# Patient Record
Sex: FEMALE | Race: BLACK OR AFRICAN AMERICAN | NOT HISPANIC OR LATINO | Employment: OTHER | URBAN - METROPOLITAN AREA
[De-identification: names, ages, dates, MRNs, and addresses within clinical notes are randomized per-mention and may not be internally consistent; named-entity substitution may affect disease eponyms.]

---

## 2023-03-17 DIAGNOSIS — K21.9 GASTROESOPHAGEAL REFLUX DISEASE WITHOUT ESOPHAGITIS: ICD-10-CM

## 2023-03-21 PROBLEM — R94.31 LONG QT INTERVAL: Status: ACTIVE | Noted: 2023-03-21

## 2023-03-21 PROBLEM — G89.29 CHRONIC LEFT-SIDED LOW BACK PAIN WITH LEFT-SIDED SCIATICA: Status: ACTIVE | Noted: 2023-03-21

## 2023-03-21 PROBLEM — I12.9 SECONDARY DM WITH CKD STAGE 3 AND HYPERTENSION (MULTI): Status: ACTIVE | Noted: 2023-03-21

## 2023-03-21 PROBLEM — N18.32 CHRONIC KIDNEY DISEASE, STAGE 3B (MULTI): Status: ACTIVE | Noted: 2023-03-21

## 2023-03-21 PROBLEM — L72.3 SEBACEOUS CYST: Status: ACTIVE | Noted: 2023-03-21

## 2023-03-21 PROBLEM — K22.70 BARRETT ESOPHAGUS: Status: ACTIVE | Noted: 2023-03-21

## 2023-03-21 PROBLEM — K21.9 GASTROESOPHAGEAL REFLUX DISEASE WITHOUT ESOPHAGITIS: Status: ACTIVE | Noted: 2023-03-21

## 2023-03-21 PROBLEM — I10 ESSENTIAL HYPERTENSION WITH GOAL BLOOD PRESSURE LESS THAN 130/80: Status: ACTIVE | Noted: 2023-03-21

## 2023-03-21 PROBLEM — E03.9 HYPOTHYROIDISM: Status: ACTIVE | Noted: 2023-03-21

## 2023-03-21 PROBLEM — L84 FOOT CALLUS: Status: ACTIVE | Noted: 2023-03-21

## 2023-03-21 PROBLEM — H61.21 IMPACTED CERUMEN OF RIGHT EAR: Status: ACTIVE | Noted: 2023-03-21

## 2023-03-21 PROBLEM — N63.0 BREAST MASS IN FEMALE: Status: ACTIVE | Noted: 2023-03-21

## 2023-03-21 PROBLEM — N18.30 SECONDARY DM WITH CKD STAGE 3 AND HYPERTENSION (MULTI): Status: ACTIVE | Noted: 2023-03-21

## 2023-03-21 PROBLEM — I34.0 CHRONIC MITRAL VALVE REGURGITATION: Status: ACTIVE | Noted: 2023-03-21

## 2023-03-21 PROBLEM — I51.9 CARDIAC DISEASE: Status: ACTIVE | Noted: 2023-03-21

## 2023-03-21 PROBLEM — R92.8 ABNORMAL FINDING ON BREAST IMAGING: Status: ACTIVE | Noted: 2023-03-21

## 2023-03-21 PROBLEM — J30.9 ALLERGIC RHINITIS: Status: ACTIVE | Noted: 2023-03-21

## 2023-03-21 PROBLEM — E11.9 ENCOUNTER FOR DIABETIC FOOT EXAM (MULTI): Status: ACTIVE | Noted: 2023-03-21

## 2023-03-21 PROBLEM — M54.42 CHRONIC LEFT-SIDED LOW BACK PAIN WITH LEFT-SIDED SCIATICA: Status: ACTIVE | Noted: 2023-03-21

## 2023-03-21 PROBLEM — R93.89 ABNORMAL CT SCAN, CHEST: Status: ACTIVE | Noted: 2023-03-21

## 2023-03-21 PROBLEM — D64.9 ANEMIA: Status: ACTIVE | Noted: 2023-03-21

## 2023-03-21 PROBLEM — I50.9 HEART FAILURE (MULTI): Status: ACTIVE | Noted: 2023-03-21

## 2023-03-21 PROBLEM — E13.22 SECONDARY DM WITH CKD STAGE 3 AND HYPERTENSION (MULTI): Status: ACTIVE | Noted: 2023-03-21

## 2023-03-21 PROBLEM — R09.A2 GLOBUS PHARYNGEUS: Status: ACTIVE | Noted: 2023-03-21

## 2023-03-21 RX ORDER — ATORVASTATIN CALCIUM 20 MG/1
1 TABLET, FILM COATED ORAL DAILY
COMMUNITY
Start: 2018-08-01 | End: 2023-04-13 | Stop reason: SDUPTHER

## 2023-03-21 RX ORDER — ASPIRIN 81 MG/1
1 TABLET ORAL DAILY
COMMUNITY

## 2023-03-21 RX ORDER — ESOMEPRAZOLE MAGNESIUM 40 MG/1
CAPSULE, DELAYED RELEASE ORAL 2 TIMES DAILY
COMMUNITY
Start: 2019-11-21 | End: 2023-06-16 | Stop reason: SDUPTHER

## 2023-03-21 RX ORDER — DULAGLUTIDE 3 MG/.5ML
INJECTION, SOLUTION SUBCUTANEOUS
COMMUNITY
Start: 2023-03-12 | End: 2023-04-12 | Stop reason: SDUPTHER

## 2023-03-21 RX ORDER — SPIRONOLACTONE 25 MG/1
1 TABLET ORAL DAILY
COMMUNITY
Start: 2019-12-10 | End: 2023-08-24 | Stop reason: SDUPTHER

## 2023-03-21 RX ORDER — LISINOPRIL 40 MG/1
1 TABLET ORAL DAILY
COMMUNITY
Start: 2016-04-20

## 2023-03-21 RX ORDER — METOCLOPRAMIDE 10 MG/1
TABLET ORAL
COMMUNITY
Start: 2019-11-13 | End: 2023-03-21 | Stop reason: SDUPTHER

## 2023-03-21 RX ORDER — METFORMIN HYDROCHLORIDE 500 MG/1
TABLET, EXTENDED RELEASE ORAL
COMMUNITY
Start: 2017-04-14 | End: 2023-05-16 | Stop reason: SDUPTHER

## 2023-03-21 RX ORDER — FUROSEMIDE 20 MG/1
1 TABLET ORAL DAILY
COMMUNITY
Start: 2016-07-29 | End: 2023-04-13 | Stop reason: SDUPTHER

## 2023-03-21 RX ORDER — AMLODIPINE BESYLATE 10 MG/1
1 TABLET ORAL DAILY
COMMUNITY
Start: 2018-01-24 | End: 2023-04-12 | Stop reason: SDUPTHER

## 2023-03-21 RX ORDER — BLOOD SUGAR DIAGNOSTIC
STRIP MISCELLANEOUS
COMMUNITY
Start: 2017-09-27

## 2023-03-21 RX ORDER — DAPAGLIFLOZIN 5 MG/1
TABLET, FILM COATED ORAL
COMMUNITY
Start: 2022-01-13 | End: 2023-08-21 | Stop reason: SDUPTHER

## 2023-03-21 RX ORDER — CARVEDILOL 25 MG/1
TABLET ORAL
COMMUNITY
Start: 2016-08-03 | End: 2023-07-13 | Stop reason: SDUPTHER

## 2023-03-21 RX ORDER — INSULIN GLARGINE 300 U/ML
INJECTION, SOLUTION SUBCUTANEOUS
COMMUNITY
End: 2023-10-20 | Stop reason: ALTCHOICE

## 2023-03-28 RX ORDER — METOCLOPRAMIDE 10 MG/1
TABLET ORAL
Qty: 60 TABLET | Refills: 0 | Status: SHIPPED | OUTPATIENT
Start: 2023-03-28 | End: 2023-05-31 | Stop reason: SDUPTHER

## 2023-04-12 DIAGNOSIS — I12.9 SECONDARY DM WITH CKD STAGE 3 AND HYPERTENSION (MULTI): ICD-10-CM

## 2023-04-12 DIAGNOSIS — N18.30 SECONDARY DM WITH CKD STAGE 3 AND HYPERTENSION (MULTI): ICD-10-CM

## 2023-04-12 DIAGNOSIS — I10 ESSENTIAL HYPERTENSION WITH GOAL BLOOD PRESSURE LESS THAN 130/80: ICD-10-CM

## 2023-04-12 DIAGNOSIS — E13.22 SECONDARY DM WITH CKD STAGE 3 AND HYPERTENSION (MULTI): ICD-10-CM

## 2023-04-12 DIAGNOSIS — I50.9 HEART FAILURE, UNSPECIFIED HF CHRONICITY, UNSPECIFIED HEART FAILURE TYPE (MULTI): Primary | ICD-10-CM

## 2023-04-16 RX ORDER — AMLODIPINE BESYLATE 10 MG/1
10 TABLET ORAL DAILY
Qty: 90 TABLET | Refills: 3 | Status: SHIPPED | OUTPATIENT
Start: 2023-04-16 | End: 2024-01-12 | Stop reason: SDUPTHER

## 2023-04-16 RX ORDER — FUROSEMIDE 20 MG/1
20 TABLET ORAL DAILY
Qty: 90 TABLET | Refills: 3 | Status: SHIPPED | OUTPATIENT
Start: 2023-04-16 | End: 2024-04-10 | Stop reason: SDUPTHER

## 2023-04-16 RX ORDER — DULAGLUTIDE 3 MG/.5ML
INJECTION, SOLUTION SUBCUTANEOUS
Qty: 6 ML | Refills: 1 | Status: SHIPPED | OUTPATIENT
Start: 2023-04-16 | End: 2023-09-26 | Stop reason: SDUPTHER

## 2023-04-16 RX ORDER — ATORVASTATIN CALCIUM 20 MG/1
20 TABLET, FILM COATED ORAL DAILY
Qty: 90 TABLET | Refills: 3 | Status: SHIPPED | OUTPATIENT
Start: 2023-04-16 | End: 2024-05-06

## 2023-05-02 ENCOUNTER — TELEPHONE (OUTPATIENT)
Dept: PRIMARY CARE | Facility: CLINIC | Age: 73
End: 2023-05-02
Payer: MEDICARE

## 2023-05-02 DIAGNOSIS — N63.10 MASS OF RIGHT BREAST, UNSPECIFIED QUADRANT: Primary | ICD-10-CM

## 2023-05-02 NOTE — TELEPHONE ENCOUNTER
I called  Advised to call to schedule appt. For deann and ultrasound    The patient agrees and understands the above plan.  All questions have been answered.  Pt thankful for the follow up call.  Reyna Ma, DO     Enlarging spiculated right breast mass with increasing pleomorphic  calcifications consistent with malignancy. Recommendations are for  further evaluation with both ultrasound and spot magnification views.

## 2023-05-16 DIAGNOSIS — I50.9 HEART FAILURE, UNSPECIFIED HF CHRONICITY, UNSPECIFIED HEART FAILURE TYPE (MULTI): Primary | ICD-10-CM

## 2023-05-16 DIAGNOSIS — N18.30 SECONDARY DM WITH CKD STAGE 3 AND HYPERTENSION (MULTI): ICD-10-CM

## 2023-05-16 DIAGNOSIS — I10 ESSENTIAL HYPERTENSION WITH GOAL BLOOD PRESSURE LESS THAN 130/80: ICD-10-CM

## 2023-05-16 DIAGNOSIS — I12.9 SECONDARY DM WITH CKD STAGE 3 AND HYPERTENSION (MULTI): ICD-10-CM

## 2023-05-16 DIAGNOSIS — E13.22 SECONDARY DM WITH CKD STAGE 3 AND HYPERTENSION (MULTI): ICD-10-CM

## 2023-05-23 RX ORDER — METFORMIN HYDROCHLORIDE 500 MG/1
500 TABLET, EXTENDED RELEASE ORAL 2 TIMES DAILY
Qty: 180 TABLET | Refills: 0 | Status: SHIPPED | OUTPATIENT
Start: 2023-05-23 | End: 2023-08-24 | Stop reason: SDUPTHER

## 2023-05-23 NOTE — TELEPHONE ENCOUNTER
Please call patient.  Refilled her medication however she needs some labs prior to her next appointment.  Order fasting labs -could do between now and our next appointment    Should fast for 10 to 12 hours prior to appointment

## 2023-05-31 DIAGNOSIS — K21.9 GASTROESOPHAGEAL REFLUX DISEASE WITHOUT ESOPHAGITIS: ICD-10-CM

## 2023-06-02 DIAGNOSIS — N63.0 BREAST MASS IN FEMALE: Primary | ICD-10-CM

## 2023-06-05 RX ORDER — METOCLOPRAMIDE 10 MG/1
TABLET ORAL
Qty: 60 TABLET | Refills: 0 | Status: SHIPPED | OUTPATIENT
Start: 2023-06-05 | End: 2023-08-02 | Stop reason: SDUPTHER

## 2023-06-09 NOTE — TELEPHONE ENCOUNTER
Patient of Dr. Ma is requesting a medication that is comparable to Esomeprazole she stated that her pharmacy is out of this medication.    I would recommend more pantoprazole than reglan for the patient   It is the same type of medicine   Remind her to use vitamin b12 and magnesium because these medicines take this out of the body

## 2023-06-10 RX ORDER — PANTOPRAZOLE SODIUM 40 MG/1
40 TABLET, DELAYED RELEASE ORAL
Qty: 30 TABLET | Refills: 11 | Status: SHIPPED | OUTPATIENT
Start: 2023-06-10 | End: 2023-06-16 | Stop reason: ALTCHOICE

## 2023-06-14 ENCOUNTER — TELEPHONE (OUTPATIENT)
Dept: PRIMARY CARE | Facility: CLINIC | Age: 73
End: 2023-06-14
Payer: MEDICARE

## 2023-06-14 DIAGNOSIS — K22.70 BARRETT'S ESOPHAGUS WITHOUT DYSPLASIA: Primary | ICD-10-CM

## 2023-06-14 DIAGNOSIS — K21.9 GASTROESOPHAGEAL REFLUX DISEASE WITHOUT ESOPHAGITIS: ICD-10-CM

## 2023-06-16 RX ORDER — ESOMEPRAZOLE MAGNESIUM 40 MG/1
40 CAPSULE, DELAYED RELEASE ORAL
Qty: 90 CAPSULE | Refills: 0 | Status: SHIPPED | OUTPATIENT
Start: 2023-06-16 | End: 2023-08-27 | Stop reason: SDUPTHER

## 2023-06-16 NOTE — TELEPHONE ENCOUNTER
I called pt  I refilled esomeprazole (per GI)  Ok to stop the pantoprazole- ordered in error.    The patient agrees and understands the above plan.  All questions have been answered.  Pt thankful for the follow up call.  Reyna Ma, DO

## 2023-07-12 LAB
COMPLETE PATHOLOGY REPORT: NORMAL
CONVERTED ADDENDUM DIAGNOSIS 2: NORMAL
CONVERTED ADDENDUM DIAGNOSIS 3: NORMAL
CONVERTED CLINICAL DIAGNOSIS-HISTORY: NORMAL
CONVERTED FINAL DIAGNOSIS: NORMAL
CONVERTED FINAL REPORT PDF LINK TO COPY AND PASTE: NORMAL
CONVERTED GROSS DESCRIPTION: NORMAL

## 2023-07-13 DIAGNOSIS — I10 ESSENTIAL HYPERTENSION WITH GOAL BLOOD PRESSURE LESS THAN 130/80: Primary | ICD-10-CM

## 2023-07-13 DIAGNOSIS — I50.9 HEART FAILURE, UNSPECIFIED HF CHRONICITY, UNSPECIFIED HEART FAILURE TYPE (MULTI): ICD-10-CM

## 2023-07-19 RX ORDER — CARVEDILOL 25 MG/1
25 TABLET ORAL
Qty: 180 TABLET | Refills: 3 | Status: SHIPPED | OUTPATIENT
Start: 2023-07-19

## 2023-08-02 DIAGNOSIS — K21.9 GASTROESOPHAGEAL REFLUX DISEASE WITHOUT ESOPHAGITIS: ICD-10-CM

## 2023-08-11 RX ORDER — METOCLOPRAMIDE 10 MG/1
TABLET ORAL
Qty: 60 TABLET | Refills: 0 | Status: SHIPPED | OUTPATIENT
Start: 2023-08-11 | End: 2023-09-26 | Stop reason: SDUPTHER

## 2023-08-21 DIAGNOSIS — E13.22 SECONDARY DM WITH CKD STAGE 3 AND HYPERTENSION (MULTI): ICD-10-CM

## 2023-08-21 DIAGNOSIS — I10 ESSENTIAL HYPERTENSION WITH GOAL BLOOD PRESSURE LESS THAN 130/80: ICD-10-CM

## 2023-08-21 DIAGNOSIS — K22.70 BARRETT'S ESOPHAGUS WITHOUT DYSPLASIA: ICD-10-CM

## 2023-08-21 DIAGNOSIS — N18.30 SECONDARY DM WITH CKD STAGE 3 AND HYPERTENSION (MULTI): ICD-10-CM

## 2023-08-21 DIAGNOSIS — I50.9 HEART FAILURE, UNSPECIFIED HF CHRONICITY, UNSPECIFIED HEART FAILURE TYPE (MULTI): ICD-10-CM

## 2023-08-21 DIAGNOSIS — K21.9 GASTROESOPHAGEAL REFLUX DISEASE WITHOUT ESOPHAGITIS: ICD-10-CM

## 2023-08-21 DIAGNOSIS — I12.9 SECONDARY DM WITH CKD STAGE 3 AND HYPERTENSION (MULTI): ICD-10-CM

## 2023-08-23 NOTE — TELEPHONE ENCOUNTER
Patient is requesting refills of her Farxiga and her esomeprazole, She states she is out of medication.

## 2023-08-24 ENCOUNTER — HOSPITAL ENCOUNTER (OUTPATIENT)
Dept: DATA CONVERSION | Facility: HOSPITAL | Age: 73
End: 2023-08-24
Attending: SURGERY | Admitting: SURGERY
Payer: MEDICARE

## 2023-08-24 DIAGNOSIS — C50.911 MALIGNANT NEOPLASM OF UNSPECIFIED SITE OF RIGHT FEMALE BREAST (MULTI): ICD-10-CM

## 2023-08-24 DIAGNOSIS — C50.919 MALIGNANT NEOPLASM OF UNSPECIFIED SITE OF UNSPECIFIED FEMALE BREAST (MULTI): ICD-10-CM

## 2023-08-24 LAB
ANION GAP IN SER/PLAS: 15 MMOL/L (ref 10–20)
CALCIUM (MG/DL) IN SER/PLAS: 10.2 MG/DL (ref 8.6–10.3)
CARBON DIOXIDE, TOTAL (MMOL/L) IN SER/PLAS: 22 MMOL/L (ref 21–32)
CHLORIDE (MMOL/L) IN SER/PLAS: 106 MMOL/L (ref 98–107)
CREATININE (MG/DL) IN SER/PLAS: 1.37 MG/DL (ref 0.5–1.05)
GFR FEMALE: 40 ML/MIN/1.73M2
GLUCOSE (MG/DL) IN SER/PLAS: 129 MG/DL (ref 74–99)
HEMATOCRIT (%) IN BLOOD BY AUTOMATED COUNT: 34.6 % (ref 36–46)
HEMOGLOBIN (G/DL) IN BLOOD: 11.5 G/DL (ref 12–16)
POCT GLUCOSE: 135 MG/DL (ref 74–99)
POTASSIUM (MMOL/L) IN SER/PLAS: 4.9 MMOL/L (ref 3.5–5.3)
SODIUM (MMOL/L) IN SER/PLAS: 138 MMOL/L (ref 136–145)
UREA NITROGEN (MG/DL) IN SER/PLAS: 28 MG/DL (ref 6–23)

## 2023-08-25 LAB
ATRIAL RATE: 76 BPM
P AXIS: 59 DEGREES
P OFFSET: 171 MS
P ONSET: 113 MS
PR INTERVAL: 198 MS
Q ONSET: 212 MS
QRS COUNT: 12 BEATS
QRS DURATION: 90 MS
QT INTERVAL: 404 MS
QTC CALCULATION(BAZETT): 454 MS
QTC FREDERICIA: 436 MS
R AXIS: -7 DEGREES
T AXIS: 27 DEGREES
T OFFSET: 414 MS
VENTRICULAR RATE: 76 BPM

## 2023-08-27 DIAGNOSIS — K21.9 GASTROESOPHAGEAL REFLUX DISEASE WITHOUT ESOPHAGITIS: ICD-10-CM

## 2023-08-27 DIAGNOSIS — K22.70 BARRETT'S ESOPHAGUS WITHOUT DYSPLASIA: ICD-10-CM

## 2023-08-27 RX ORDER — ESOMEPRAZOLE MAGNESIUM 40 MG/1
40 CAPSULE, DELAYED RELEASE ORAL
Qty: 30 CAPSULE | Refills: 0 | Status: SHIPPED | OUTPATIENT
Start: 2023-08-27 | End: 2023-10-10 | Stop reason: SDUPTHER

## 2023-08-29 LAB
COMPLETE PATHOLOGY REPORT: NORMAL
CONVERTED ADDENDUM COMMENT 2: NORMAL
CONVERTED ADDENDUM DIAGNOSIS 2: NORMAL
CONVERTED CLINICAL DIAGNOSIS-HISTORY: NORMAL
CONVERTED FINAL DIAGNOSIS: NORMAL
CONVERTED FINAL REPORT PDF LINK TO COPY AND PASTE: NORMAL
CONVERTED GROSS DESCRIPTION: NORMAL
CONVERTED SYNOPTIC DIAGNOSIS: NORMAL

## 2023-08-30 RX ORDER — SPIRONOLACTONE 25 MG/1
25 TABLET ORAL DAILY
Qty: 90 TABLET | Refills: 3 | Status: SHIPPED | OUTPATIENT
Start: 2023-08-30

## 2023-08-30 RX ORDER — ESOMEPRAZOLE MAGNESIUM 40 MG/1
40 CAPSULE, DELAYED RELEASE ORAL
Qty: 90 CAPSULE | Refills: 0 | OUTPATIENT
Start: 2023-08-30

## 2023-08-30 RX ORDER — DAPAGLIFLOZIN 5 MG/1
5 TABLET, FILM COATED ORAL DAILY
Qty: 90 TABLET | Refills: 3 | Status: SHIPPED | OUTPATIENT
Start: 2023-08-30

## 2023-08-30 RX ORDER — METFORMIN HYDROCHLORIDE 500 MG/1
500 TABLET, EXTENDED RELEASE ORAL 2 TIMES DAILY
Qty: 180 TABLET | Refills: 3 | Status: SHIPPED | OUTPATIENT
Start: 2023-08-30

## 2023-09-01 ENCOUNTER — TELEPHONE (OUTPATIENT)
Dept: PRIMARY CARE | Facility: CLINIC | Age: 73
End: 2023-09-01

## 2023-09-01 NOTE — TELEPHONE ENCOUNTER
Patient requested the following prescriptions:   dapagliflozin propanediol (Farxiga) 5 mg     If approved please send to pharmacy listed:  [facility] [address] [place] [address] - [phone]

## 2023-09-08 NOTE — TELEPHONE ENCOUNTER
Patient called and would like to know it is okay to take the flu shot, RSV shot, and the COVID shot. She is very concerned about getting sick. I can call her back with a definitive answer. Please advise

## 2023-09-13 NOTE — TELEPHONE ENCOUNTER
Okay to take all 3 vaccines.  If she would like to space them out and not take them at the same time okay to do this as well.    Everyone's reaction to the vaccine is different so I am unsure if she would get sick behind any of those vaccines  -Assure her that she cannot get RSV, COVID, or flu after if you have those vaccines, they are not live viruses

## 2023-09-19 DIAGNOSIS — E13.22 SECONDARY DM WITH CKD STAGE 3 AND HYPERTENSION (MULTI): ICD-10-CM

## 2023-09-19 DIAGNOSIS — K21.9 GASTROESOPHAGEAL REFLUX DISEASE WITHOUT ESOPHAGITIS: ICD-10-CM

## 2023-09-19 DIAGNOSIS — N18.30 SECONDARY DM WITH CKD STAGE 3 AND HYPERTENSION (MULTI): ICD-10-CM

## 2023-09-19 DIAGNOSIS — I12.9 SECONDARY DM WITH CKD STAGE 3 AND HYPERTENSION (MULTI): ICD-10-CM

## 2023-09-19 NOTE — TELEPHONE ENCOUNTER
Rx refill.   metoclopramide (Reglan) 10 mg tablet 60 tablet 0 8/11/2023     Use with meals po, prn nausea 3-4 X per day          [facility] [address] [place] [address] [facility] Phone: [phone]   Fax: [phone]        Patient c/o nausea every now and then.  Patient called again. Please, refill  Trulicity is needed. She took last pen on 9/24/23.

## 2023-09-29 VITALS — WEIGHT: 171.3 LBS | HEIGHT: 62 IN | BODY MASS INDEX: 31.52 KG/M2

## 2023-09-30 NOTE — H&P
History & Physical Reviewed:   I have reviewed the History and Physical dated:  24-Aug-2023   History and Physical reviewed and relevant findings noted. Patient examined to review pertinent physical  findings.: No significant changes   Home Medications Reviewed: no changes noted   Allergies Reviewed: no changes noted       ERAS (Enhanced Recovery After Surgery):  ·  ERAS Patient: no     Consent:   COVID-19 Consent:  ·  COVID-19 Risk Consent Surgeon has reviewed key risks related to the risk of fol COVID-19 and if they contract COVID-19 what the risks are.       Electronic Signatures:  Concepcion Elizondo)  (Signed 24-Aug-2023 10:34)   Authored: History & Physical Reviewed, ERAS, Consent,  Note Completion      Last Updated: 24-Aug-2023 10:34 by Concepcion Elizondo)

## 2023-10-01 PROBLEM — L82.1 OTHER SEBORRHEIC KERATOSIS: Status: ACTIVE | Noted: 2023-03-02

## 2023-10-01 PROBLEM — L57.9 SKIN CHANGES DUE TO CHRONIC EXPOSURE TO NONIONIZING RADIATION, UNSPECIFIED: Status: ACTIVE | Noted: 2023-03-02

## 2023-10-01 PROBLEM — D48.5 NEOPLASM OF UNCERTAIN BEHAVIOR OF SKIN: Status: ACTIVE | Noted: 2023-03-02

## 2023-10-01 PROBLEM — C50.919 BREAST CANCER (MULTI): Status: ACTIVE | Noted: 2023-10-01

## 2023-10-01 RX ORDER — TRETINOIN 0.25 MG/G
CREAM TOPICAL
COMMUNITY
Start: 2022-11-15

## 2023-10-01 NOTE — OP NOTE
PROCEDURE DETAILS    Preoperative Diagnosis:  Malignant neoplasm determined by biopsy of breast, C50.919    Postoperative Diagnosis:  Malignant neoplasm determined by biopsy of breast, C50.919    Surgeon: Concepcion Elizondo  Resident/Fellow/Other Assistant: Dolores Chavarria    Procedure:  1. RIGHT BREAST MAGSEED LOCALIZED BRACKETED PARTIAL MASTECTOMY    Anesthesia: No anesthesiologist associated with this case  Estimated Blood Loss: < 30 cc  Findings: 1) clip, mag seed x 2, calcs in specimen xray        Operative Report:   OPERATIVE PROCEDURE: Informed  consent was obtained. The surgical site marked and the Day of Surgery Update completed. The patient was taken  to the operating room and positioned in a supine position on the operating room table. Anesthesia was induced without difficulty. SCDs were placed for DVT prophylaxis. Antibiotics were administered within 60 minutes prior to incision for surgical  prophylaxis. Lower body Jose Hugger was applied to help maintain normothermia.     I reviewed my note and the appropriate films.  A surgical safety time-out was performed.     Using the sentimag probe, I approximated the location of the mag seeds marking the lesion of concern.      The patient was sterilely prepped and draped in the usual fashion.       I then turned my attention to the right breast. The skin and surrounding tissue was anesthetized with local anesthetic. A   circumareolar skin incision was made with a 15 blade scalpel.  Subcutaneous tissues were sharply divided down to and into subcutaneous fat using Bovie cautery.  I dissected towards  the mag seed. Mag seed location was confirmed throughout using the sentimag probe. I grasped the intended specimen  with an Allis clamp.  I then dissected towards the more superior and posterior seed. The entire area of tissue surrounding the mag seeds was excised en bloc with the mag seeds intact.  The specimen was oriented with a short stitch superiorly and a long  stitch laterally, and labeled as right breast mag seed localized partial mastectomy (bracket).  Specimen radiograph was taken and reviewed by the radiologist. The clip was identified  in the specimen and the mag seeds were intact. Calcifications were present.  Radiologically the margins appeared adequate.  The film was reviewed by radiology.     I then excised 6 cavity shave margins using the bovie and oriented the new margin in each with a stitch.      I placed clips around the perimeter of the lumpectomy cavity and 2 clips in the posterior margin. The cavity was irrigated with sterile saline solution.  Hemostasis was achieved  and confirmed with cautery.       Hemostasis was achieved with bovie.  The  deep dermal layer was closed with interrupted 3-0 vicryl sutures.  The skin was closed with a running 4-0 monocryl subcuticular stitch.      A sterile dressing was applied.  A surgical bra was used for light compression.     Anesthesia was reversed and the patient was brought to the recovery room in stable condition having tolerated the procedure well.  There were no complications.  30 cc of 1% lidocaine/0.25%  marcaine mixed was used throughout the case.                               Attestation:   Note Completion:  Attending Attestation I performed the procedure without a resident         Electronic Signatures:  Concepcion Elizondo)  (Signed 24-Aug-2023 12:03)   Authored: Post-Operative Note, Chart Review, Note Completion      Last Updated: 24-Aug-2023 12:03 by Concepcion Elizondo)

## 2023-10-02 RX ORDER — METOCLOPRAMIDE 10 MG/1
TABLET ORAL
Qty: 60 TABLET | Refills: 2 | Status: SHIPPED
Start: 2023-10-02 | End: 2023-10-20 | Stop reason: ALTCHOICE

## 2023-10-02 RX ORDER — DULAGLUTIDE 3 MG/.5ML
INJECTION, SOLUTION SUBCUTANEOUS
Qty: 6 ML | Refills: 1 | Status: SHIPPED | OUTPATIENT
Start: 2023-10-02 | End: 2024-03-25

## 2023-10-04 ENCOUNTER — HOSPITAL ENCOUNTER (OUTPATIENT)
Dept: RADIATION ONCOLOGY | Facility: CLINIC | Age: 73
Setting detail: RADIATION/ONCOLOGY SERIES
Discharge: STILL ADMITTED OR ADMINISTRATIVE | End: 2023-10-04
Payer: MEDICARE

## 2023-10-04 VITALS
TEMPERATURE: 96.8 F | RESPIRATION RATE: 18 BRPM | HEART RATE: 71 BPM | WEIGHT: 163.8 LBS | SYSTOLIC BLOOD PRESSURE: 132 MMHG | HEIGHT: 61 IN | BODY MASS INDEX: 30.93 KG/M2 | DIASTOLIC BLOOD PRESSURE: 61 MMHG

## 2023-10-04 PROCEDURE — 99214 OFFICE O/P EST MOD 30 MIN: CPT | Performed by: RADIOLOGY

## 2023-10-04 PROCEDURE — 99204 OFFICE O/P NEW MOD 45 MIN: CPT | Performed by: RADIOLOGY

## 2023-10-04 ASSESSMENT — PAIN SCALES - GENERAL: PAINLEVEL: 0-NO PAIN

## 2023-10-04 NOTE — PROGRESS NOTES
Radiation Oncology Outpatient Consult    Patient Name:  Amy Driver  MRN:  [id number]  :  1950    Referring Provider: Concepcion Elizondo MD  Primary Care Provider: Reyna Ma DO  Care Team: Patient Care Team:  Reyna Ma DO as PCP - General  Reyna Ma DO as PCP - [employer] Medicare Advantage PCP    Date of Service: 10/4/2023     SUBJECTIVE  History of Present Illness:  Amy Driver is a 73 y.o. female who was referred by Concepcion Elizondo MD, for a consultation to the [facility] Department of Radiation Oncology.  She is presenting for evaluation and management of a T1c N0 M0, stage IA invasive ductal carcinoma of the right breast status post right partial mastectomy.    Ms. Driver is a 73-year-old female who underwent a routine screening mammogram on 2023, which revealed an enlarging spiculated mass in the upper outer quadrant of the right breast with associated pleomorphic calcifications.  Diagnostic views performed 2023 revealed a 3.3 x 2.3 x 1.8 cm irregular mass with associated calcifications.  She underwent a right breast ultrasound directed at the 10 o'clock position, 7 cm from the nipple which revealed a 2.8 x 1.6 x 3 cm heterogeneous mass which had been biopsied in the past and is consistent with a hamartoma.  At the 11 o'clock position, 4 cm from the nipple was a 1.9 x 2 x 1.7 cm hypoechoic mass.  Axillary ultrasound revealed 2 normal appearing lymph nodes.  On 2023 she underwent a right breast core biopsy directed at the 11 o'clock position, 4 cm from the nipple which revealed invasive ductal carcinoma, grade 2.  Estrogen and progesterone receptors stained positive at greater than 95%.  HER2/itz was 2+ on IHC and 1.2 on dual-velia.  On 2023 she underwent a right partial mastectomy.  Pathology revealed a 2 cm invasive ductal carcinoma, grade 3.  No angiolymphatic invasion was present.  Ductal carcinoma in situ of the comedo, cribriform and solid  subtypes, nuclear grade 3 was present.  There was no element of extensive intraductal component.  The resection margins were negative with tumor 0.12 cm from the lateral margin.  I was asked to see Ms. Villa by Dr. Concepcion Elizondo for a discussion regarding the role of radiation in the management of this newly diagnosed breast cancer.    Prior Radiotherapy:  No  Radiation Treatments       No radiation treatments to show. (Treatments may have been administered in another system.)          Current Systemic Treatment:  No     Presence of Pacemaker or ICD:  No    Past Medical History:    Past Medical History:   Diagnosis Date    Chronic kidney disease, stage 3b (CMS/HCC) 04/19/2022    Chronic kidney disease, stage 3b    Chronic kidney disease, stage 3b (CMS/HCC) 04/19/2022    Chronic kidney disease, stage 3b    Diabetes mellitus (CMS/HCC)     Essential (primary) hypertension 01/03/2018    Essential hypertension with goal blood pressure less than 140/90    Other obesity due to excess calories 04/06/2021    Class 1 obesity due to excess calories with serious comorbidity and body mass index (BMI) of 30.0 to 30.9 in adult    Other specified abnormal findings of blood chemistry 07/29/2016    Elevated brain natriuretic peptide (BNP) level    Other specified cardiac arrhythmias 09/07/2016    Bigeminy    Personal history of other diseases of the circulatory system     History of hypertension    Personal history of other diseases of the digestive system     History of Mills's esophagus    Personal history of other endocrine, nutritional and metabolic disease     History of diabetes mellitus    Personal history of other endocrine, nutritional and metabolic disease 09/07/2016    History of diabetes mellitus    Personal history of other medical treatment 08/06/2016    History of screening mammography    Personal history of other specified conditions 08/31/2020    History of abnormal mammogram    Personal history of other specified  conditions 11/17/2019    History of nausea and vomiting    Personal history of pneumonia (recurrent) 08/06/2016    History of pneumonia    Shortness of breath 08/03/2016    Shortness of breath at rest        Past Surgical History:    Past Surgical History:   Procedure Laterality Date    BI MAMMO GUIDED LOCALIZATION BREAST RIGHT Right 8/21/2023    BI MAMMO GUIDED LOCALIZATION BREAST RIGHT 8/21/2023 [facility]    OTHER SURGICAL HISTORY  12/12/2019    Esophagogastroduodenoscopy        Family History:  There is no family history of breast cancer.    Gynecologic History: Menarche age 12. G0.  Menopause age 44. She took oral contraceptive pills for 2 years.  She denies any history of hormone replacement therapy.    Social History:    Social History     Tobacco Use    Smoking status: Former     Types: Cigarettes    Smokeless tobacco: Never   Vaping Use    Vaping Use: Never used   Substance Use Topics    Alcohol use: Not Currently       Allergies:  No Known Allergies     Medications:    Current Outpatient Medications:     amLODIPine (Norvasc) 10 mg tablet, Take 1 tablet (10 mg) by mouth once daily., Disp: 90 tablet, Rfl: 3    aspirin 81 mg EC tablet, Take 1 tablet (81 mg) by mouth once daily., Disp: , Rfl:     atorvastatin (Lipitor) 20 mg tablet, Take 1 tablet (20 mg) by mouth once daily., Disp: 90 tablet, Rfl: 3    carvedilol (Coreg) 25 mg tablet, Take 1 tablet (25 mg) by mouth 2 times a day with meals., Disp: 180 tablet, Rfl: 3    dapagliflozin propanediol (Farxiga) 5 mg, Take 1 tablet (5 mg) by mouth once daily., Disp: 90 tablet, Rfl: 3    dulaglutide (Trulicity) 3 mg/0.5 mL pen injector, INJECT 1 PEN UNDER THE SKIN ONE TIME WEEKLY ON THE SAME DAY EACH WEEK, Disp: 6 mL, Rfl: 1    esomeprazole (NexIUM) 40 mg DR capsule, Take 1 capsule (40 mg) by mouth once daily in the morning. Take before meals., Disp: 30 capsule, Rfl: 0    furosemide (Lasix) 20 mg tablet, Take 1 tablet (20 mg) by mouth once daily., Disp: 90 tablet, Rfl:  3    insulin glargine (Toujeo SoloStar U-300 Insulin) 300 unit/mL (1.5 mL) injection, Inject under the skin., Disp: , Rfl:     lisinopril 40 mg tablet, Take 1 tablet (40 mg) by mouth once daily., Disp: , Rfl:     metFORMIN  mg 24 hr tablet, Take 1 tablet (500 mg) by mouth 2 times a day., Disp: 180 tablet, Rfl: 3    metoclopramide (Reglan) 10 mg tablet, Use with meals po, prn nausea 3-4 X per day (Patient not taking: Reported on 10/4/2023), Disp: 60 tablet, Rfl: 2    multivitamin-Ca-iron-minerals (Tab-A-Valerio Womens) 27-0.4 mg tablet, Take 1 tablet by mouth once daily., Disp: , Rfl:     OneTouch Ultra Test strip, test 2 to 3 times daily as directed, Disp: , Rfl:     spironolactone (Aldactone) 25 mg tablet, Take 1 tablet (25 mg) by mouth once daily. (Patient not taking: Reported on 10/4/2023), Disp: 90 tablet, Rfl: 3    tretinoin (Retin-A) 0.025 % cream, 1 Application, Disp: , Rfl:         Review of Systems:  Review of Systems   All other systems reviewed and are negative.    The patient's current pain level was assessed.  They report currently having a pain of 0 out of 10.  They feel their pain is under control without the use of pain medications.    Performance Status:  The Karnofsky performance scale today is 100, Fully active, able to carry on all pre-disease performed without restriction (ECOG equivalent 0).        OBJECTIVE  Physical Exam:  There were no vitals taken for this visit.   Physical Exam  Constitutional:       Appearance: Normal appearance.   HENT:      Head: Normocephalic and atraumatic.   Eyes:      Conjunctiva/sclera: Conjunctivae normal.   Cardiovascular:      Rate and Rhythm: Rhythm irregular.      Heart sounds: Normal heart sounds.   Pulmonary:      Breath sounds: Normal breath sounds. No wheezing.   Chest:      Comments: Examination of the right breast reveals a well-healing incision in the upper outer aspect with a large seroma beneath.  There are no suspicious nodules, nipple retraction,  or nipple discharge bilaterally.  Abdominal:      Palpations: Abdomen is soft.   Musculoskeletal:      Right lower leg: No edema.      Left lower leg: No edema.   Lymphadenopathy:      Comments: There is no cervical, supraclavicular or axillary lymphadenopathy.   Skin:     General: Skin is warm and dry.   Neurological:      Mental Status: She is alert.               ASSESSMENT:  Amy Driver is a 73 y.o. female with Breast cancer (CMS/[facility]), Pathologic: Stage Unknown (pT1c, pNX, cM0, G3, ER+, GA+, HER2-, Oncotype DX score: 23).  She is status post right partial mastectomy.     PLAN: I had a long conversation with the patient detailing the role of radiation in the management of this newly diagnosed breast cancer.  We did review the trials looking at small, estrogen receptor positive, lymph node negative breast cancer in women over the age of 65 who received endocrine therapy with and without radiation.  I did tell her there was a 10% reduction in local recurrence with the addition of radiation.  We did also discussed that grade 3 tumors were underrepresented in those trials and her actual recurrence risk may be a little bit higher.  We did review the details of radiation as well.She does understand the reduction in local recurrence although has declined radiation in favor of endocrine therapy.  I have asked her to reach out with any questions or concerns.  I will see her back on a as needed basis.  NCCN Guidelines were applicable to guide this patients treatment plan.   Heike River MD

## 2023-10-10 DIAGNOSIS — K21.9 GASTROESOPHAGEAL REFLUX DISEASE WITHOUT ESOPHAGITIS: ICD-10-CM

## 2023-10-10 DIAGNOSIS — K22.70 BARRETT'S ESOPHAGUS WITHOUT DYSPLASIA: ICD-10-CM

## 2023-10-12 NOTE — TELEPHONE ENCOUNTER
esomeprazole (NexIUM) 40 mg DR capsule   [facility] - [place], [place] - [address] [facility] Phone: [phone]   Fax: [phone]      Patient stated she has no refills.

## 2023-10-15 RX ORDER — ESOMEPRAZOLE MAGNESIUM 40 MG/1
40 CAPSULE, DELAYED RELEASE ORAL
Qty: 90 CAPSULE | Refills: 0 | Status: SHIPPED | OUTPATIENT
Start: 2023-10-15 | End: 2024-01-09 | Stop reason: SDUPTHER

## 2023-10-17 ENCOUNTER — TELEPHONE (OUTPATIENT)
Dept: PRIMARY CARE | Facility: CLINIC | Age: 73
End: 2023-10-17
Payer: MEDICARE

## 2023-10-17 ENCOUNTER — APPOINTMENT (OUTPATIENT)
Dept: HEMATOLOGY/ONCOLOGY | Facility: CLINIC | Age: 73
End: 2023-10-17
Payer: MEDICARE

## 2023-10-17 NOTE — TELEPHONE ENCOUNTER
I sent this over on 10/15/23- pls have her check with the pharm to be sure  And let me know if she is having issues

## 2023-10-17 NOTE — TELEPHONE ENCOUNTER
Patient has called about her RX refill esomeprazole (NexIUM) 40 mg DR capsule [[id number]]    Order Details    Dose: 40 mg Route: oral Frequency: Daily before breakfast   Dispense Quantity: 90 capsule Refills: 0       Associated Diagnoses: Mills's esophagus without dysplasia [K22.70]; Gastroesophageal reflux disease without esophagitis [K21.9]   Original Order: esomeprazole (NexIUM) 40 mg DR capsule [[id number]]   Providers    Ordering and Authorizing Provider: Reyna Ma DO NPI: [id number]   GABINO #: [id number]   Ordering User: Reyna Ma DO          Pharmacy    [id number] - [place], [place] - [address] [facility] [address] [facility] [address] [address] [facility] [address]  Phone: [phone]  Fax: [phone]  GABINO #: [id number]

## 2023-10-20 ENCOUNTER — OFFICE VISIT (OUTPATIENT)
Dept: HEMATOLOGY/ONCOLOGY | Facility: CLINIC | Age: 73
End: 2023-10-20
Payer: MEDICARE

## 2023-10-20 ENCOUNTER — ONCOLOGY MEDICATION OUTREACH (OUTPATIENT)
Dept: HEMATOLOGY/ONCOLOGY | Facility: CLINIC | Age: 73
End: 2023-10-20
Payer: MEDICARE

## 2023-10-20 VITALS
TEMPERATURE: 98.1 F | DIASTOLIC BLOOD PRESSURE: 72 MMHG | HEART RATE: 67 BPM | OXYGEN SATURATION: 99 % | SYSTOLIC BLOOD PRESSURE: 141 MMHG | WEIGHT: 167.99 LBS | BODY MASS INDEX: 31.31 KG/M2

## 2023-10-20 DIAGNOSIS — M81.8 OTHER OSTEOPOROSIS WITHOUT CURRENT PATHOLOGICAL FRACTURE: ICD-10-CM

## 2023-10-20 DIAGNOSIS — N63.0 BREAST MASS IN FEMALE: ICD-10-CM

## 2023-10-20 DIAGNOSIS — Z17.0 MALIGNANT NEOPLASM OF UPPER-OUTER QUADRANT OF RIGHT BREAST IN FEMALE, ESTROGEN RECEPTOR POSITIVE (MULTI): Primary | ICD-10-CM

## 2023-10-20 DIAGNOSIS — C50.411 MALIGNANT NEOPLASM OF UPPER-OUTER QUADRANT OF RIGHT BREAST IN FEMALE, ESTROGEN RECEPTOR POSITIVE (MULTI): Primary | ICD-10-CM

## 2023-10-20 PROCEDURE — 3078F DIAST BP <80 MM HG: CPT | Performed by: INTERNAL MEDICINE

## 2023-10-20 PROCEDURE — 4010F ACE/ARB THERAPY RXD/TAKEN: CPT | Performed by: INTERNAL MEDICINE

## 2023-10-20 PROCEDURE — 99205 OFFICE O/P NEW HI 60 MIN: CPT | Performed by: INTERNAL MEDICINE

## 2023-10-20 PROCEDURE — 1159F MED LIST DOCD IN RCRD: CPT | Performed by: INTERNAL MEDICINE

## 2023-10-20 PROCEDURE — 1126F AMNT PAIN NOTED NONE PRSNT: CPT | Performed by: INTERNAL MEDICINE

## 2023-10-20 PROCEDURE — 1036F TOBACCO NON-USER: CPT | Performed by: INTERNAL MEDICINE

## 2023-10-20 PROCEDURE — 3077F SYST BP >= 140 MM HG: CPT | Performed by: INTERNAL MEDICINE

## 2023-10-20 PROCEDURE — 3051F HG A1C>EQUAL 7.0%<8.0%: CPT | Performed by: INTERNAL MEDICINE

## 2023-10-20 PROCEDURE — 99215 OFFICE O/P EST HI 40 MIN: CPT | Performed by: INTERNAL MEDICINE

## 2023-10-20 RX ORDER — ANASTROZOLE 1 MG/1
1 TABLET ORAL DAILY
Qty: 30 TABLET | Refills: 5 | Status: SHIPPED | OUTPATIENT
Start: 2023-10-20 | End: 2024-04-17

## 2023-10-20 ASSESSMENT — PAIN SCALES - GENERAL: PAINLEVEL: 0-NO PAIN

## 2023-10-20 NOTE — PROGRESS NOTES
Patient Visit Information:   Date of Service: 10/20/2023   Referring Provider:  Visit Type: New Visit      Cancer History:          Breast         AJCC Edition: 8th (AJCC), Diagnosis Date: 07/07/2023     Synopsis:      73 y.o. postmenopausal AA female with right-sided invasive ductal carcinoma, stage IA (T1c NX M0).  The patient's breast cancer was diagnosed on July 7, 2023, and is grade 2, estrogen receptor positive at >95%, progesterone receptor >95%, and HER-2/itz equivocal and not amplified via FISH.       ONCOLOGIC HISTORY:  Breast cancer (CMS/HCC), Pathologic: Stage Unknown (pT1c, pNX, cM0, G3, ER+, CT+, HER2-, Oncotype DX score: 23)      TREATMENT HISTORY  05/01/2023: Patient underwent bilateral screening mammogram. This revealed a spiculated mas in the UOQ of the right breast associated with pleomorphic calcifications  05/02/2023: Patient underwent a right diagnostic mammogram and US. This confirmed a mass 3.3 x 2.3 x 1.8 cm with calcifications. US revealed a mass in the right breast at 10:00, 7 cm from the nipple measuring 2.8 x 1.6 x 3 cm which had been biopsied in the past and was consistent with a hamartoma. In addition, at 11:00, 4 cm from the nipple was a 1.9 x 2 x 1.7 cm mass. Right axillary US revealed 2 normal looking LN.  07/07/2023: Patient underwent an US-guided core needle biopsy of the mass at 11:00, 4 cm from the nipple. Pathology was consistent with invasive ductal carcinoma with results as above.   08/24/2023:  Patient underwent a right partial mastectomy without SLNB. Pathology was consistent with a 2 cm invasive carcinoma, grade 3, with clear margins    History of Present Illness: Patient ID: Amy Driver is a 73 y.o. female.        Chief Complaint: Here for adjuvant treatment of breast cancer.     Interval History:    Ms. Driver is a pleasant 73 y.o. postmenopausal AA female with right-sided invasive ductal carcinoma, stage IA (T1c NX M0).  The patient's breast cancer was diagnosed on  July 7, 2023, and is grade 2, estrogen receptor positive at >95%, progesterone receptor >95%, and HER-2/itz equivocal and not amplified via FISH. Oncotype DX score: 23).     Details of her history are as follows:    Back on 05/01/2023, patient underwent bilateral screening mammogram. This revealed a spiculated mas in the UOQ of the right breast associated with pleomorphic calcifications. On 05/02/2023: Patient underwent a right diagnostic mammogram and US. This confirmed a mass 3.3 x 2.3 x 1.8 cm with calcifications. US revealed a mass in the right breast at 10:00, 7 cm from the nipple measuring 2.8 x 1.6 x 3 cm which had been biopsied in the past and was consistent with a hamartoma. In addition, at 11:00, 4 cm from the nipple was a 1.9 x 2 x 1.7 cm mass. Right axillary US revealed 2 normal looking LN.    Patient underwent an US-guided core needle biopsy of the mass at 11:00, 4 cm from the nipple.on 07/07/2023.  Pathology was consistent with invasive ductal carcinoma with results as above.     On 8/24/2023, patient underwent a right partial mastectomy without SLNB. Pathology was consistent with a 2 cm invasive carcinoma, grade 3, with clear margins.    She comes in for adjuvant treatment consideration. She has already had a radiation oncology consult and is weighting her options.         Review of Systems:     Review of Systems - Oncology  A 14-point review of system was completed and was negative except for what is noted in HPI.         Allergies and Intolerances:       Allergies: No Known Allergies              Outpatient Medication Profile:   Current Outpatient Medications   Medication Sig Dispense Refill    amLODIPine (Norvasc) 10 mg tablet Take 1 tablet (10 mg) by mouth once daily. 90 tablet 3    aspirin 81 mg EC tablet Take 1 tablet (81 mg) by mouth once daily.      atorvastatin (Lipitor) 20 mg tablet Take 1 tablet (20 mg) by mouth once daily. 90 tablet 3    carvedilol (Coreg) 25 mg tablet Take 1 tablet (25  mg) by mouth 2 times a day with meals. 180 tablet 3    dapagliflozin propanediol (Farxiga) 5 mg Take 1 tablet (5 mg) by mouth once daily. 90 tablet 3    dulaglutide (Trulicity) 3 mg/0.5 mL pen injector INJECT 1 PEN UNDER THE SKIN ONE TIME WEEKLY ON THE SAME DAY EACH WEEK 6 mL 1    esomeprazole (NexIUM) 40 mg DR capsule Take 1 capsule (40 mg) by mouth once daily in the morning. Take before meals. 90 capsule 0    furosemide (Lasix) 20 mg tablet Take 1 tablet (20 mg) by mouth once daily. 90 tablet 3    lisinopril 40 mg tablet Take 1 tablet (40 mg) by mouth once daily.      metFORMIN  mg 24 hr tablet Take 1 tablet (500 mg) by mouth 2 times a day. 180 tablet 3    multivitamin-Ca-iron-minerals (Tab-A-Valerio Womens) 27-0.4 mg tablet Take 1 tablet by mouth once daily.      OneTouch Ultra Test strip test 2 to 3 times daily as directed      spironolactone (Aldactone) 25 mg tablet Take 1 tablet (25 mg) by mouth once daily. 90 tablet 3    tretinoin (Retin-A) 0.025 % cream 1 Application       No current facility-administered medications for this visit.              Medical History:    Past Medical History:   Diagnosis Date    Chronic kidney disease, stage 3b (CMS/HCC) 04/19/2022    Chronic kidney disease, stage 3b    Chronic kidney disease, stage 3b (CMS/HCC) 04/19/2022    Chronic kidney disease, stage 3b    Diabetes mellitus (CMS/[facility])     Essential (primary) hypertension 01/03/2018    Essential hypertension with goal blood pressure less than 140/90    Other obesity due to excess calories 04/06/2021    Class 1 obesity due to excess calories with serious comorbidity and body mass index (BMI) of 30.0 to 30.9 in adult    Other specified abnormal findings of blood chemistry 07/29/2016    Elevated brain natriuretic peptide (BNP) level    Other specified cardiac arrhythmias 09/07/2016    Bigeminy    Personal history of other diseases of the circulatory system     History of hypertension    Personal history of other diseases of the  digestive system     History of Mills's esophagus    Personal history of other endocrine, nutritional and metabolic disease     History of diabetes mellitus    Personal history of other endocrine, nutritional and metabolic disease 09/07/2016    History of diabetes mellitus    Personal history of other medical treatment 08/06/2016    History of screening mammography    Personal history of other specified conditions 08/31/2020    History of abnormal mammogram    Personal history of other specified conditions 11/17/2019    History of nausea and vomiting    Personal history of pneumonia (recurrent) 08/06/2016    History of pneumonia    Shortness of breath 08/03/2016    Shortness of breath at rest      Surgical History:   Past Surgical History:   Procedure Laterality Date    BI MAMMO GUIDED LOCALIZATION BREAST RIGHT Right 8/21/2023    BI MAMMO GUIDED LOCALIZATION BREAST RIGHT 8/21/2023 [facility]    OTHER SURGICAL HISTORY  12/12/2019    Esophagogastroduodenoscopy              Family History:  There is no family history of breast cancer.     Gynecologic History: Menarche age 12. G0.  Menopause age 44. She took oral contraceptive pills for 2 years.  She denies any history of hormone replacement therapy.    OBJECTIVE:    VS / Pain:  /72   Pulse 67   Temp 36.7 °C (98.1 °F)   Wt 76.2 kg (167 lb 15.9 oz)   SpO2 99%   BMI 31.31 kg/m²   BSA: 1.82 meters squared   Pain Scale: 0    The ECOG performance scale today is Asymptomatic    Physical Exam  Physical Exam:      Constitutional: Well developed, awake/alert/oriented  x3, no distress, alert and cooperative   Eyes: PERRL, EOMI, clear sclera   ENMT: mucous membranes moist, no apparent injury,  no lesions seen   Head/Neck: Neck supple, no apparent injury, thyroid  without mass or tenderness, No JVD, trachea midline, no bruits   Respiratory/Thorax: Patent airways, CTAB, normal  breath sounds with good chest expansion, thorax symmetric   Cardiovascular: Regular, rate and  rhythm, no murmurs,  2+ equal pulses of the extremities, normal S 1and S 2   Gastrointestinal: Nondistended, soft, non-tender,  no rebound tenderness or guarding, no masses palpable, no organomegaly, +BS, no bruits   Musculoskeletal: ROM intact, no joint swelling, normal  strength   Extremities: Left upper extremity with hyperpigmentation  tracking along a vein starting in antecubital fossa   Neurological: alert and oriented x3, intact senses,  motor, response and reflexes, normal strength   Breast: s/p rt sided partial mastectomy with well  healed surgical incision. No palpable mass in the left breast. No palpable axillary or supraclavicular lymphadenopathies bilaterally. No swelling of either upper extremity which had free range of motion.   Lymphatic: No significant lymphadenopathy   Psychological: Appropriate mood and behavior   Skin: Warm and dry, no lesions, no rashes       DIAGNOSTICRESULTSBEGIN@    RESULTS:    Lab Results   Component Value Date    WBC 6.7 01/11/2023    HGB 11.5 (L) 08/24/2023    HCT 34.6 (L) 08/24/2023    MCV 90 01/11/2023     01/11/2023        Chemistry    Lab Results   Component Value Date/Time     08/24/2023 0944    K 4.9 08/24/2023 0944     08/24/2023 0944    CO2 22 08/24/2023 0944    BUN 28 (H) 08/24/2023 0944    CREATININE 1.37 (H) 08/24/2023 0944    Lab Results   Component Value Date/Time    CALCIUM 10.2 08/24/2023 0944    ALKPHOS 71 04/06/2021 1241    AST 15 04/06/2022 1236    ALT 17 04/06/2022 1236    BILITOT 0.3 04/06/2021 1241         Study Result    Narrative & Impression   Interpreted By:  WENDY DEGROOT MD  MRN: [id number]  Patient Name: PADDY MORAN     STUDY:  DIGITAL DIAG MAMMO RIGHT UNILAT; BREAST ULTRASOUND;  6/1/2023 12:08  pm; 6/1/2023 12:22 pm     ACCESSION NUMBER(S):  [id number]; [id number]     ORDERING CLINICIAN:  ELIN MOLINA     INDICATION:  The patient was recalled from screening mammogram dated 05/01/2023  for an enlarging spiculated right  breast mass with increasing  pleomorphic calcifications consistent with malignancy.     COMPARISON:  Mammograms dated 05/01/2023, 01/04/2022, 08/31/2020, 08/13/2020.  Ultrasounds dated 10/26/2020, 09/09/2020.     FINDINGS:  There are areas of scattered fibroglandular tissue. An irregular mass  is again seen in the upper outer quadrant at mid depth. There are  irregular pleomorphic calcifications associated with the mass and  extending posteriorly. The entire area of mass and calcifications  spans approximately 3.3 x 2.3 x 1.8 cm.     Ultrasound: Targeted ultrasound with elastography of the upper outer  right breast was performed. At 10 o'clock 7 cm from the nipple, an  oval circumscribed heterogeneously echogenic mass is seen measuring  2.8 x 1.6 x 3.0 cm. There is no internal vascularity. It is soft on  elastography. This corresponds to the previously seen hamartoma,  previously annotated at 9 o'clock 9 cm from the nipple on the  ultrasound dated 10/26/2020. This likely corresponds to a  circumscribed mass in the upper outer breast at posterior depth. At  11 o'clock 4 cm from the nipple, an irregular hypoechoic mass with  indistinct and angular margins is seen measuring 1.9 x 2.0 x 1.7 cm.  It demonstrates internal vascularity. It is hard on elastography.     Targeted ultrasound of the right axilla was also performed. 2  morphologically normal lymph nodes are seen.     IMPRESSION:  Suspicious mass with associated suspicious calcifications in the  right breast. Surgical consultation and ultrasound guided core needle  biopsy of the mass are recommended. The calcifications are associated  with the mass and extend posteriorly, with the entire area spanning  up to 3.3 cm.     Unremarkable sonographic evaluation the right axilla.     Redemonstration of the previously seen hamartoma at 10 o'clock.     Dr. Ladd discussed the findings and recommendations with the  patient at the time of this dictation. A pre-procedure  form was  completed. A message was sent to the referring practioner at the time  of this dictation regarding these critical findings using the [employer]  system.     BI-RADS CATEGORY:     Category: 4 - Suspicious.  Recommendation: Biopsy Recommended.       Assessment and Plan:   Assessment  Ms. Driver is a pleasant 73 y.o. postmenopausal AA female with right-sided invasive ductal carcinoma, stage IA (T1c NX M0).  The patient's breast cancer was diagnosed on July 7, 2023, and is grade 2, estrogen receptor positive at >95%, progesterone receptor >95%, and HER-2/itz equivocal and not amplified via FISH. Oncotype DX score: 23).     Patient is s/p primary breast surgery. She is awaiting radiation therapy. She comes in today to discuss adjuvant systemic endocrine treatment options.     Given patient's small tumor size, node-negative disease and hormone receptor positivity, her risk of recurrence is low per oncotype. Therefore I do not recommend adjuvant chemotherapy. She will however,  benefit from systemic endocrine therapy to minimize her risk of recurrence.     The two recommended systemic endocrine therapies, Aromatase Inhibitors and Tamoxifen, were discussed with patient. Side effects that were discussed included but were not limited to osteoporosis, arthritis arthralgia vaginal bleeding VTE, endometrial cancer,  hot flashes, liver toxicity. After much deliberation patient agreed to proceed with Arimidex.      In addition, patient informed me of her decision to proceed with radiation. This is reasonable given grade 3 disease and reasonable life expectancy. Dr. River has been informed.    Plan    Proceed with radiation  After radiation start  Anastrozole 1mg po daily  Take Vitamin D and Calcium supplements  DEXA before next visit  Consider Adjuvant Zometa to de discussed after Dexa  Engage in at least 150min/s of Aerobic exercise weekly which translates to 30 minutes of brisk walking daily  RTC 6 months

## 2023-10-20 NOTE — PROGRESS NOTES
Reviewed drug, dose, frequency, administration, treatment cycle, duration of therapy, and missed doses. Counseled on potential side effects including but not limited to . Discussed techniques to mitigate severity of side effects such as hot flashes, muscle/joint pain and osteoporosis..  Provided medication/regimen handout. Patient had questions about anaastrozole. All questions answered and contact information was given to patient.

## 2023-10-31 ENCOUNTER — ANCILLARY PROCEDURE (OUTPATIENT)
Dept: RADIOLOGY | Facility: CLINIC | Age: 73
End: 2023-10-31
Payer: MEDICARE

## 2023-10-31 DIAGNOSIS — Z17.0 MALIGNANT NEOPLASM OF UPPER-OUTER QUADRANT OF RIGHT BREAST IN FEMALE, ESTROGEN RECEPTOR POSITIVE (MULTI): Primary | ICD-10-CM

## 2023-10-31 DIAGNOSIS — C50.411 MALIGNANT NEOPLASM OF UPPER-OUTER QUADRANT OF RIGHT BREAST IN FEMALE, ESTROGEN RECEPTOR POSITIVE (MULTI): Primary | ICD-10-CM

## 2023-10-31 DIAGNOSIS — Z17.0 MALIGNANT NEOPLASM OF UPPER-OUTER QUADRANT OF RIGHT BREAST IN FEMALE, ESTROGEN RECEPTOR POSITIVE (MULTI): ICD-10-CM

## 2023-10-31 DIAGNOSIS — C50.411 MALIGNANT NEOPLASM OF UPPER-OUTER QUADRANT OF RIGHT BREAST IN FEMALE, ESTROGEN RECEPTOR POSITIVE (MULTI): ICD-10-CM

## 2023-10-31 PROCEDURE — 77334 RADIATION TREATMENT AID(S): CPT | Performed by: RADIOLOGY

## 2023-10-31 PROCEDURE — 77332 RADIATION TREATMENT AID(S): CPT | Performed by: RADIOLOGY

## 2023-10-31 PROCEDURE — 77332 RADIATION TREATMENT AID(S): CPT | Mod: 59 | Performed by: RADIOLOGY

## 2023-10-31 PROCEDURE — 77290 THER RAD SIMULAJ FIELD CPLX: CPT | Performed by: RADIOLOGY

## 2023-10-31 PROCEDURE — 77263 THER RADIOLOGY TX PLNG CPLX: CPT | Performed by: RADIOLOGY

## 2023-11-13 ENCOUNTER — HOSPITAL ENCOUNTER (OUTPATIENT)
Dept: RADIATION ONCOLOGY | Facility: CLINIC | Age: 73
Setting detail: RADIATION/ONCOLOGY SERIES
Discharge: HOME | End: 2023-11-13
Payer: MEDICARE

## 2023-11-13 PROCEDURE — 77300 RADIATION THERAPY DOSE PLAN: CPT | Performed by: RADIOLOGY

## 2023-11-13 PROCEDURE — 77295 3-D RADIOTHERAPY PLAN: CPT | Performed by: RADIOLOGY

## 2023-11-13 PROCEDURE — 77334 RADIATION TREATMENT AID(S): CPT | Performed by: RADIOLOGY

## 2023-11-16 PROCEDURE — 77336 RADIATION PHYSICS CONSULT: CPT | Performed by: RADIOLOGY

## 2023-11-17 ENCOUNTER — APPOINTMENT (OUTPATIENT)
Dept: RADIATION ONCOLOGY | Facility: CLINIC | Age: 73
End: 2023-11-17
Payer: MEDICARE

## 2023-11-19 ENCOUNTER — APPOINTMENT (OUTPATIENT)
Dept: RADIATION ONCOLOGY | Facility: CLINIC | Age: 73
End: 2023-11-19
Payer: MEDICARE

## 2023-11-20 ENCOUNTER — APPOINTMENT (OUTPATIENT)
Dept: RADIATION ONCOLOGY | Facility: CLINIC | Age: 73
End: 2023-11-20
Payer: MEDICARE

## 2023-11-21 ENCOUNTER — APPOINTMENT (OUTPATIENT)
Dept: RADIATION ONCOLOGY | Facility: CLINIC | Age: 73
End: 2023-11-21
Payer: MEDICARE

## 2023-11-22 ENCOUNTER — APPOINTMENT (OUTPATIENT)
Dept: RADIATION ONCOLOGY | Facility: CLINIC | Age: 73
End: 2023-11-22
Payer: MEDICARE

## 2023-11-27 ENCOUNTER — HOSPITAL ENCOUNTER (OUTPATIENT)
Dept: RADIATION ONCOLOGY | Facility: CLINIC | Age: 73
Setting detail: RADIATION/ONCOLOGY SERIES
Discharge: HOME | End: 2023-11-27
Payer: MEDICARE

## 2023-11-27 DIAGNOSIS — C50.411 MALIGNANT NEOPLASM OF UPPER-OUTER QUADRANT OF RIGHT FEMALE BREAST (MULTI): ICD-10-CM

## 2023-11-27 LAB
RAD ONC MSQ ACTUAL FRACTIONS DELIVERED: 1
RAD ONC MSQ ACTUAL SESSION DELIVERED DOSE: 266 CGRAY
RAD ONC MSQ ACTUAL TOTAL DOSE: 266 CGRAY
RAD ONC MSQ ELAPSED DAYS: 0
RAD ONC MSQ LAST DATE: NORMAL
RAD ONC MSQ PRESCRIBED FRACTIONAL DOSE: 266 CGRAY
RAD ONC MSQ PRESCRIBED NUMBER OF FRACTIONS: 16
RAD ONC MSQ PRESCRIBED TECHNIQUE: NORMAL
RAD ONC MSQ PRESCRIBED TOTAL DOSE: 4256 CGRAY
RAD ONC MSQ PRESCRIPTION PATTERN COMMENT: NORMAL
RAD ONC MSQ START DATE: NORMAL
RAD ONC MSQ TREATMENT COURSE NUMBER: 1
RAD ONC MSQ TREATMENT SITE: NORMAL

## 2023-11-27 PROCEDURE — 77412 RADIATION TX DELIVERY LVL 3: CPT | Performed by: RADIOLOGY

## 2023-11-28 ENCOUNTER — HOSPITAL ENCOUNTER (OUTPATIENT)
Dept: RADIATION ONCOLOGY | Facility: CLINIC | Age: 73
Setting detail: RADIATION/ONCOLOGY SERIES
Discharge: HOME | End: 2023-11-28
Payer: MEDICARE

## 2023-11-28 DIAGNOSIS — C50.411 MALIGNANT NEOPLASM OF UPPER-OUTER QUADRANT OF RIGHT FEMALE BREAST (MULTI): ICD-10-CM

## 2023-11-28 LAB
RAD ONC MSQ ACTUAL FRACTIONS DELIVERED: 2
RAD ONC MSQ ACTUAL SESSION DELIVERED DOSE: 266 CGRAY
RAD ONC MSQ ACTUAL TOTAL DOSE: 532 CGRAY
RAD ONC MSQ ELAPSED DAYS: 1
RAD ONC MSQ LAST DATE: NORMAL
RAD ONC MSQ PRESCRIBED FRACTIONAL DOSE: 266 CGRAY
RAD ONC MSQ PRESCRIBED NUMBER OF FRACTIONS: 16
RAD ONC MSQ PRESCRIBED TECHNIQUE: NORMAL
RAD ONC MSQ PRESCRIBED TOTAL DOSE: 4256 CGRAY
RAD ONC MSQ PRESCRIPTION PATTERN COMMENT: NORMAL
RAD ONC MSQ START DATE: NORMAL
RAD ONC MSQ TREATMENT COURSE NUMBER: 1
RAD ONC MSQ TREATMENT SITE: NORMAL

## 2023-11-28 PROCEDURE — 77412 RADIATION TX DELIVERY LVL 3: CPT | Performed by: RADIOLOGY

## 2023-11-29 ENCOUNTER — APPOINTMENT (OUTPATIENT)
Dept: RADIATION ONCOLOGY | Facility: CLINIC | Age: 73
End: 2023-11-29
Payer: MEDICARE

## 2023-11-30 ENCOUNTER — HOSPITAL ENCOUNTER (OUTPATIENT)
Dept: RADIATION ONCOLOGY | Facility: CLINIC | Age: 73
Setting detail: RADIATION/ONCOLOGY SERIES
Discharge: HOME | End: 2023-11-30
Payer: MEDICARE

## 2023-11-30 DIAGNOSIS — C50.411 MALIGNANT NEOPLASM OF UPPER-OUTER QUADRANT OF RIGHT FEMALE BREAST (MULTI): ICD-10-CM

## 2023-11-30 LAB
RAD ONC MSQ ACTUAL FRACTIONS DELIVERED: 3
RAD ONC MSQ ACTUAL SESSION DELIVERED DOSE: 266 CGRAY
RAD ONC MSQ ACTUAL TOTAL DOSE: 798 CGRAY
RAD ONC MSQ ELAPSED DAYS: 3
RAD ONC MSQ LAST DATE: NORMAL
RAD ONC MSQ PRESCRIBED FRACTIONAL DOSE: 266 CGRAY
RAD ONC MSQ PRESCRIBED NUMBER OF FRACTIONS: 16
RAD ONC MSQ PRESCRIBED TECHNIQUE: NORMAL
RAD ONC MSQ PRESCRIBED TOTAL DOSE: 4256 CGRAY
RAD ONC MSQ PRESCRIPTION PATTERN COMMENT: NORMAL
RAD ONC MSQ START DATE: NORMAL
RAD ONC MSQ TREATMENT COURSE NUMBER: 1
RAD ONC MSQ TREATMENT SITE: NORMAL

## 2023-11-30 PROCEDURE — 77412 RADIATION TX DELIVERY LVL 3: CPT | Performed by: RADIOLOGY

## 2023-12-01 ENCOUNTER — RADIATION ONCOLOGY OTV (OUTPATIENT)
Dept: RADIATION ONCOLOGY | Facility: CLINIC | Age: 73
End: 2023-12-01
Payer: MEDICARE

## 2023-12-01 ENCOUNTER — SOCIAL WORK (OUTPATIENT)
Dept: CASE MANAGEMENT | Facility: HOSPITAL | Age: 73
End: 2023-12-01
Payer: MEDICARE

## 2023-12-01 ENCOUNTER — HOSPITAL ENCOUNTER (OUTPATIENT)
Dept: RADIATION ONCOLOGY | Facility: CLINIC | Age: 73
Setting detail: RADIATION/ONCOLOGY SERIES
Discharge: HOME | End: 2023-12-01
Payer: MEDICARE

## 2023-12-01 VITALS
RESPIRATION RATE: 16 BRPM | HEART RATE: 53 BPM | OXYGEN SATURATION: 98 % | SYSTOLIC BLOOD PRESSURE: 144 MMHG | TEMPERATURE: 97.2 F | WEIGHT: 166.89 LBS | BODY MASS INDEX: 31.11 KG/M2 | DIASTOLIC BLOOD PRESSURE: 73 MMHG

## 2023-12-01 DIAGNOSIS — Z51.0 ENCOUNTER FOR ANTINEOPLASTIC RADIATION THERAPY: ICD-10-CM

## 2023-12-01 DIAGNOSIS — C50.411 MALIGNANT NEOPLASM OF UPPER-OUTER QUADRANT OF RIGHT FEMALE BREAST (MULTI): ICD-10-CM

## 2023-12-01 LAB
RAD ONC MSQ ACTUAL FRACTIONS DELIVERED: 4
RAD ONC MSQ ACTUAL SESSION DELIVERED DOSE: 266 CGRAY
RAD ONC MSQ ACTUAL TOTAL DOSE: 1064 CGRAY
RAD ONC MSQ ELAPSED DAYS: 4
RAD ONC MSQ LAST DATE: NORMAL
RAD ONC MSQ PRESCRIBED FRACTIONAL DOSE: 266 CGRAY
RAD ONC MSQ PRESCRIBED NUMBER OF FRACTIONS: 16
RAD ONC MSQ PRESCRIBED TECHNIQUE: NORMAL
RAD ONC MSQ PRESCRIBED TOTAL DOSE: 4256 CGRAY
RAD ONC MSQ PRESCRIPTION PATTERN COMMENT: NORMAL
RAD ONC MSQ START DATE: NORMAL
RAD ONC MSQ TREATMENT COURSE NUMBER: 1
RAD ONC MSQ TREATMENT SITE: NORMAL

## 2023-12-01 PROCEDURE — 77412 RADIATION TX DELIVERY LVL 3: CPT | Performed by: RADIOLOGY

## 2023-12-01 PROCEDURE — 77427 RADIATION TX MANAGEMENT X5: CPT | Performed by: RADIOLOGY

## 2023-12-01 ASSESSMENT — PAIN SCALES - GENERAL: PAINLEVEL: 0-NO PAIN

## 2023-12-01 NOTE — PROGRESS NOTES
Radiation Oncology On Treatment Visit    Patient Name:  Amy Driver  MRN:  [id number]  :  1950    Referring Provider: No ref. provider found  Primary Care Provider: Reyna Ma DO  Care Team: Patient Care Team:  Reyna Ma DO as PCP - General  Reyna Ma DO as PCP - [employer]age PCP  Nannette Albert MD as Consulting Physician (Hematology and Oncology)    Date of Service: 2023     Diagnosis:   Specialty Problems          Radiation Oncology Problems    Breast cancer (CMS/HCC)        Malignant neoplasm of upper-outer quadrant of right breast in female, estrogen receptor positive (CMS/HCC)         Treatment Summary:  Radiation Treatments       Active   Right breast (Started on 2023)   Most recent fraction: 266 cGy given on 2023   Total given: 1,064 cGy / 4,256 cGy  (4 of 16 fractions)   Elapsed Days: 4   Technique: Opposed Tangential                   SUBJECTIVE: No complaints.        OBJECTIVE:   Vital Signs:  /73 (BP Location: Right arm, Patient Position: Sitting, BP Cuff Size: Large adult)   Pulse 53   Temp 36.2 °C (97.2 °F) (Temporal)   Resp 16   Wt 75.7 kg (166 lb 14.2 oz)   SpO2 98%   BMI 31.11 kg/m²    Pain Scale: The patient's current pain level was assessed.  They report currently having a pain of 0 out of 10.    Other Pertinent Findings: No erythema, skin intact.    Toxicity Assessment          2023    12:39   Toxicity Assessment   Adverse Events Reviewed (WDL) Yes (Within Defined Limits)   Treatment Site Breast   Anorexia Grade 0   Anxiety Grade 0   Dehydration Grade 0   Depression Grade 0   Dermatitis Radiation Grade 0       given aquaphor and skintegrity and instructions of use.   Diarrhea Grade 0   Fatigue Grade 0   Fibrosis Deep Connective Tissue Grade 0   Fracture Grade 0   Nausea Grade 0   Pain Grade 0   Treatment Related Secondary Malignancy Grade 0   Tumor Pain Grade 0   Vomiting Grade 0   Abdominal Pain Grade 0   Dysphagia Grade 0    Esophagitis Grade 0   Gastric Hemorrhage Grade 0   Mucositis Oral Grade 0   Dry Mouth Grade 0   Breast Infection Grade 0   Seroma Grade 0   Joint Range of Motion Decreased Grade 0   Joint Range of Motion Decreased Lumbar Spine Grade 0   Brachial Plexopathy Grade 0   Breast Atrophy Grade 0   Breast Pain Grade 0   Nipple Deformity Grade 0   Pneumonitis Grade 0   Edema Limbs Grade 0   Lymphedema Grade 0   Thromboembolic Event Grade 0   Hot Flashes Grade 0        Assessment / Plan:  The patient is tolerating radiation therapy as anticipated.  Continue per current treatment plan.   Chart and films reviewed and approved.  Chart reviewed with patient, questions answered.

## 2023-12-04 ENCOUNTER — HOSPITAL ENCOUNTER (OUTPATIENT)
Dept: RADIATION ONCOLOGY | Facility: CLINIC | Age: 73
Setting detail: RADIATION/ONCOLOGY SERIES
Discharge: HOME | End: 2023-12-04
Payer: MEDICARE

## 2023-12-04 DIAGNOSIS — Z51.0 ENCOUNTER FOR ANTINEOPLASTIC RADIATION THERAPY: ICD-10-CM

## 2023-12-04 DIAGNOSIS — C50.411 MALIGNANT NEOPLASM OF UPPER-OUTER QUADRANT OF RIGHT FEMALE BREAST (MULTI): ICD-10-CM

## 2023-12-04 LAB
RAD ONC MSQ ACTUAL FRACTIONS DELIVERED: 5
RAD ONC MSQ ACTUAL SESSION DELIVERED DOSE: 266 CGRAY
RAD ONC MSQ ACTUAL TOTAL DOSE: 1330 CGRAY
RAD ONC MSQ ELAPSED DAYS: 7
RAD ONC MSQ LAST DATE: NORMAL
RAD ONC MSQ PRESCRIBED FRACTIONAL DOSE: 266 CGRAY
RAD ONC MSQ PRESCRIBED NUMBER OF FRACTIONS: 16
RAD ONC MSQ PRESCRIBED TECHNIQUE: NORMAL
RAD ONC MSQ PRESCRIBED TOTAL DOSE: 4256 CGRAY
RAD ONC MSQ PRESCRIPTION PATTERN COMMENT: NORMAL
RAD ONC MSQ START DATE: NORMAL
RAD ONC MSQ TREATMENT COURSE NUMBER: 1
RAD ONC MSQ TREATMENT SITE: NORMAL

## 2023-12-04 PROCEDURE — 77336 RADIATION PHYSICS CONSULT: CPT | Performed by: RADIOLOGY

## 2023-12-04 PROCEDURE — 77412 RADIATION TX DELIVERY LVL 3: CPT | Performed by: RADIOLOGY

## 2023-12-04 NOTE — PROGRESS NOTES
SW met with patient following radiation therapy today. Patient is in need of some transportation assistance for radiation. SW introduced self. Patient's niece has volunteered to bring patient for radiation therapy. However, there are a few days when treatment is earlier in the day and niece can not bring due to work committment. SW able to provide transportation via the [employer]. SW explained the program, patient agreeable. SW provided my contact information for any additional issues or concerns. Patient appreciative. SW to follow.

## 2023-12-05 ENCOUNTER — HOSPITAL ENCOUNTER (OUTPATIENT)
Dept: RADIATION ONCOLOGY | Facility: CLINIC | Age: 73
Setting detail: RADIATION/ONCOLOGY SERIES
Discharge: HOME | End: 2023-12-05
Payer: MEDICARE

## 2023-12-05 DIAGNOSIS — Z51.0 ENCOUNTER FOR ANTINEOPLASTIC RADIATION THERAPY: ICD-10-CM

## 2023-12-05 DIAGNOSIS — C50.411 MALIGNANT NEOPLASM OF UPPER-OUTER QUADRANT OF RIGHT FEMALE BREAST (MULTI): ICD-10-CM

## 2023-12-05 LAB
RAD ONC MSQ ACTUAL FRACTIONS DELIVERED: 6
RAD ONC MSQ ACTUAL SESSION DELIVERED DOSE: 266 CGRAY
RAD ONC MSQ ACTUAL TOTAL DOSE: 1596 CGRAY
RAD ONC MSQ ELAPSED DAYS: 8
RAD ONC MSQ LAST DATE: NORMAL
RAD ONC MSQ PRESCRIBED FRACTIONAL DOSE: 266 CGRAY
RAD ONC MSQ PRESCRIBED NUMBER OF FRACTIONS: 16
RAD ONC MSQ PRESCRIBED TECHNIQUE: NORMAL
RAD ONC MSQ PRESCRIBED TOTAL DOSE: 4256 CGRAY
RAD ONC MSQ PRESCRIPTION PATTERN COMMENT: NORMAL
RAD ONC MSQ START DATE: NORMAL
RAD ONC MSQ TREATMENT COURSE NUMBER: 1
RAD ONC MSQ TREATMENT SITE: NORMAL

## 2023-12-05 PROCEDURE — 77412 RADIATION TX DELIVERY LVL 3: CPT | Performed by: RADIOLOGY

## 2023-12-06 ENCOUNTER — HOSPITAL ENCOUNTER (OUTPATIENT)
Dept: RADIATION ONCOLOGY | Facility: CLINIC | Age: 73
Setting detail: RADIATION/ONCOLOGY SERIES
Discharge: HOME | End: 2023-12-06
Payer: MEDICARE

## 2023-12-06 DIAGNOSIS — Z51.0 ENCOUNTER FOR ANTINEOPLASTIC RADIATION THERAPY: ICD-10-CM

## 2023-12-06 DIAGNOSIS — C50.411 MALIGNANT NEOPLASM OF UPPER-OUTER QUADRANT OF RIGHT FEMALE BREAST (MULTI): ICD-10-CM

## 2023-12-06 LAB
RAD ONC MSQ ACTUAL FRACTIONS DELIVERED: 7
RAD ONC MSQ ACTUAL SESSION DELIVERED DOSE: 266 CGRAY
RAD ONC MSQ ACTUAL TOTAL DOSE: 1862 CGRAY
RAD ONC MSQ ELAPSED DAYS: 9
RAD ONC MSQ LAST DATE: NORMAL
RAD ONC MSQ PRESCRIBED FRACTIONAL DOSE: 266 CGRAY
RAD ONC MSQ PRESCRIBED NUMBER OF FRACTIONS: 16
RAD ONC MSQ PRESCRIBED TECHNIQUE: NORMAL
RAD ONC MSQ PRESCRIBED TOTAL DOSE: 4256 CGRAY
RAD ONC MSQ PRESCRIPTION PATTERN COMMENT: NORMAL
RAD ONC MSQ START DATE: NORMAL
RAD ONC MSQ TREATMENT COURSE NUMBER: 1
RAD ONC MSQ TREATMENT SITE: NORMAL

## 2023-12-06 PROCEDURE — 77412 RADIATION TX DELIVERY LVL 3: CPT | Performed by: RADIOLOGY

## 2023-12-07 ENCOUNTER — HOSPITAL ENCOUNTER (OUTPATIENT)
Dept: RADIATION ONCOLOGY | Facility: CLINIC | Age: 73
Setting detail: RADIATION/ONCOLOGY SERIES
End: 2023-12-07
Payer: MEDICARE

## 2023-12-08 ENCOUNTER — RADIATION ONCOLOGY OTV (OUTPATIENT)
Dept: RADIATION ONCOLOGY | Facility: CLINIC | Age: 73
End: 2023-12-08
Payer: MEDICARE

## 2023-12-08 VITALS
WEIGHT: 167.55 LBS | BODY MASS INDEX: 31.23 KG/M2 | DIASTOLIC BLOOD PRESSURE: 82 MMHG | TEMPERATURE: 97 F | RESPIRATION RATE: 18 BRPM | SYSTOLIC BLOOD PRESSURE: 157 MMHG | HEART RATE: 64 BPM | OXYGEN SATURATION: 97 %

## 2023-12-08 ASSESSMENT — PAIN SCALES - GENERAL: PAINLEVEL: 0-NO PAIN

## 2023-12-08 NOTE — PROGRESS NOTES
Radiation Oncology On Treatment Visit    Patient Name:  Amy Driver  MRN:  [id number]  :  1950    Referring Provider: No ref. provider found  Primary Care Provider: Reyna Ma DO  Care Team: Patient Care Team:  Reyna Ma DO as PCP - General  Reyna Ma DO as PCP - [employer] Advantage PCP  Nannette Albert MD as Consulting Physician (Hematology and Oncology)    Date of Service: 2023     Diagnosis:   Specialty Problems          Radiation Oncology Problems    Breast cancer (CMS/HCC)        Malignant neoplasm of upper-outer quadrant of right breast in female, estrogen receptor positive (CMS/HCC)         Treatment Summary:  Radiation Treatments       Active   Right breast (Started on 2023)   Most recent fraction: 266 cGy given on 2023   Total given: 2,128 cGy / 4,256 cGy  (9 of 16 fractions)   Elapsed Days: 11   Technique: Opposed Tangential           Completed   No historical radiation treatments to show.             SUBJECTIVE: No complaints.       OBJECTIVE:   Vital Signs:  /82 (BP Location: Right arm, Patient Position: Sitting, BP Cuff Size: Adult)   Pulse 64   Temp 36.1 °C (97 °F) (Temporal)   Resp 18   Wt 76 kg (167 lb 8.8 oz)   SpO2 97%   BMI 31.23 kg/m²    Pain Scale: The patient's current pain level was assessed.  They report currently having a pain of 0 out of 10.    Other Pertinent Findings: Mild hyperpigmentation, skin intact.    Toxicity Assessment          2023    12:39 2023    11:52   Toxicity Assessment   Adverse Events Reviewed (WDL) Yes (Within Defined Limits) No (Exceptions to WDL)   Treatment Site Breast Breast   Anorexia Grade 0 Grade 0   Anxiety Grade 0 Grade 0   Dehydration Grade 0 Grade 0   Depression Grade 0 Grade 0   Dermatitis Radiation Grade 0       given aquaphor and skintegrity and instructions of use. Grade 0       using skintegrity and aquaphor   Diarrhea Grade 0 Grade 0   Fatigue Grade 0 Grade 0   Fibrosis Deep Connective  Tissue Grade 0 Grade 0   Fracture Grade 0 Grade 0   Nausea Grade 0 Grade 0   Pain Grade 0 Grade 0   Treatment Related Secondary Malignancy Grade 0 Grade 0   Tumor Pain Grade 0 Grade 0   Vomiting Grade 0 Grade 0   Abdominal Pain Grade 0    Dysphagia Grade 0    Esophagitis Grade 0    Gastric Hemorrhage Grade 0    Mucositis Oral Grade 0    Dry Mouth Grade 0 Grade 0   Breast Infection Grade 0 Grade 0   Seroma Grade 0 Grade 0   Joint Range of Motion Decreased Grade 0 Grade 0   Joint Range of Motion Decreased Lumbar Spine Grade 0 Grade 0   Brachial Plexopathy Grade 0 Grade 0   Breast Atrophy Grade 0 Grade 0   Breast Pain Grade 0 Grade 0   Nipple Deformity Grade 0 Grade 0   Pneumonitis Grade 0 Grade 0   Edema Limbs Grade 0 Grade 0   Lymphedema Grade 0 Grade 0   Thromboembolic Event Grade 0 Grade 0   Hot Flashes Grade 0 Grade 0        Assessment / Plan:  The patient is tolerating radiation therapy as anticipated.  Continue per current treatment plan.   Chart and films reviewed and approved.

## 2023-12-11 ENCOUNTER — HOSPITAL ENCOUNTER (OUTPATIENT)
Dept: RADIATION ONCOLOGY | Facility: CLINIC | Age: 73
Setting detail: RADIATION/ONCOLOGY SERIES
Discharge: HOME | End: 2023-12-11
Payer: MEDICARE

## 2023-12-11 DIAGNOSIS — C50.411 MALIGNANT NEOPLASM OF UPPER-OUTER QUADRANT OF RIGHT FEMALE BREAST (MULTI): ICD-10-CM

## 2023-12-11 DIAGNOSIS — Z51.0 ENCOUNTER FOR ANTINEOPLASTIC RADIATION THERAPY: ICD-10-CM

## 2023-12-11 LAB
RAD ONC MSQ ACTUAL FRACTIONS DELIVERED: 9
RAD ONC MSQ ACTUAL SESSION DELIVERED DOSE: 266 CGRAY
RAD ONC MSQ ACTUAL TOTAL DOSE: 2394 CGRAY
RAD ONC MSQ ELAPSED DAYS: 14
RAD ONC MSQ LAST DATE: NORMAL
RAD ONC MSQ PRESCRIBED FRACTIONAL DOSE: 266 CGRAY
RAD ONC MSQ PRESCRIBED NUMBER OF FRACTIONS: 16
RAD ONC MSQ PRESCRIBED TECHNIQUE: NORMAL
RAD ONC MSQ PRESCRIBED TOTAL DOSE: 4256 CGRAY
RAD ONC MSQ PRESCRIPTION PATTERN COMMENT: NORMAL
RAD ONC MSQ START DATE: NORMAL
RAD ONC MSQ TREATMENT COURSE NUMBER: 1
RAD ONC MSQ TREATMENT SITE: NORMAL

## 2023-12-11 PROCEDURE — 77336 RADIATION PHYSICS CONSULT: CPT | Mod: MUE | Performed by: RADIOLOGY

## 2023-12-11 PROCEDURE — 77412 RADIATION TX DELIVERY LVL 3: CPT | Performed by: RADIOLOGY

## 2023-12-11 PROCEDURE — 77336 RADIATION PHYSICS CONSULT: CPT | Performed by: RADIOLOGY

## 2023-12-12 ENCOUNTER — HOSPITAL ENCOUNTER (OUTPATIENT)
Dept: RADIATION ONCOLOGY | Facility: CLINIC | Age: 73
Setting detail: RADIATION/ONCOLOGY SERIES
Discharge: HOME | End: 2023-12-12
Payer: MEDICARE

## 2023-12-12 DIAGNOSIS — C50.411 MALIGNANT NEOPLASM OF UPPER-OUTER QUADRANT OF RIGHT FEMALE BREAST (MULTI): ICD-10-CM

## 2023-12-12 DIAGNOSIS — Z51.0 ENCOUNTER FOR ANTINEOPLASTIC RADIATION THERAPY: ICD-10-CM

## 2023-12-12 LAB
RAD ONC MSQ ACTUAL FRACTIONS DELIVERED: 10
RAD ONC MSQ ACTUAL SESSION DELIVERED DOSE: 266 CGRAY
RAD ONC MSQ ACTUAL TOTAL DOSE: 2660 CGRAY
RAD ONC MSQ ELAPSED DAYS: 15
RAD ONC MSQ LAST DATE: NORMAL
RAD ONC MSQ PRESCRIBED FRACTIONAL DOSE: 266 CGRAY
RAD ONC MSQ PRESCRIBED NUMBER OF FRACTIONS: 16
RAD ONC MSQ PRESCRIBED TECHNIQUE: NORMAL
RAD ONC MSQ PRESCRIBED TOTAL DOSE: 4256 CGRAY
RAD ONC MSQ PRESCRIPTION PATTERN COMMENT: NORMAL
RAD ONC MSQ START DATE: NORMAL
RAD ONC MSQ TREATMENT COURSE NUMBER: 1
RAD ONC MSQ TREATMENT SITE: NORMAL

## 2023-12-12 PROCEDURE — 77412 RADIATION TX DELIVERY LVL 3: CPT | Performed by: RADIOLOGY

## 2023-12-13 ENCOUNTER — HOSPITAL ENCOUNTER (OUTPATIENT)
Dept: RADIATION ONCOLOGY | Facility: CLINIC | Age: 73
Setting detail: RADIATION/ONCOLOGY SERIES
Discharge: HOME | End: 2023-12-13
Payer: MEDICARE

## 2023-12-13 DIAGNOSIS — Z51.0 ENCOUNTER FOR ANTINEOPLASTIC RADIATION THERAPY: ICD-10-CM

## 2023-12-13 DIAGNOSIS — C50.411 MALIGNANT NEOPLASM OF UPPER-OUTER QUADRANT OF RIGHT FEMALE BREAST (MULTI): ICD-10-CM

## 2023-12-13 LAB
RAD ONC MSQ ACTUAL FRACTIONS DELIVERED: 11
RAD ONC MSQ ACTUAL SESSION DELIVERED DOSE: 266 CGRAY
RAD ONC MSQ ACTUAL TOTAL DOSE: 2926 CGRAY
RAD ONC MSQ ELAPSED DAYS: 16
RAD ONC MSQ LAST DATE: NORMAL
RAD ONC MSQ PRESCRIBED FRACTIONAL DOSE: 266 CGRAY
RAD ONC MSQ PRESCRIBED NUMBER OF FRACTIONS: 16
RAD ONC MSQ PRESCRIBED TECHNIQUE: NORMAL
RAD ONC MSQ PRESCRIBED TOTAL DOSE: 4256 CGRAY
RAD ONC MSQ PRESCRIPTION PATTERN COMMENT: NORMAL
RAD ONC MSQ START DATE: NORMAL
RAD ONC MSQ TREATMENT COURSE NUMBER: 1
RAD ONC MSQ TREATMENT SITE: NORMAL

## 2023-12-13 PROCEDURE — 77412 RADIATION TX DELIVERY LVL 3: CPT | Performed by: RADIOLOGY

## 2023-12-14 ENCOUNTER — HOSPITAL ENCOUNTER (OUTPATIENT)
Dept: RADIATION ONCOLOGY | Facility: CLINIC | Age: 73
Setting detail: RADIATION/ONCOLOGY SERIES
Discharge: HOME | End: 2023-12-14
Payer: MEDICARE

## 2023-12-14 DIAGNOSIS — C50.411 MALIGNANT NEOPLASM OF UPPER-OUTER QUADRANT OF RIGHT FEMALE BREAST (MULTI): ICD-10-CM

## 2023-12-14 DIAGNOSIS — Z51.0 ENCOUNTER FOR ANTINEOPLASTIC RADIATION THERAPY: ICD-10-CM

## 2023-12-14 LAB
RAD ONC MSQ ACTUAL FRACTIONS DELIVERED: 12
RAD ONC MSQ ACTUAL SESSION DELIVERED DOSE: 266 CGRAY
RAD ONC MSQ ACTUAL TOTAL DOSE: 3192 CGRAY
RAD ONC MSQ ELAPSED DAYS: 17
RAD ONC MSQ LAST DATE: NORMAL
RAD ONC MSQ PRESCRIBED FRACTIONAL DOSE: 266 CGRAY
RAD ONC MSQ PRESCRIBED NUMBER OF FRACTIONS: 16
RAD ONC MSQ PRESCRIBED TECHNIQUE: NORMAL
RAD ONC MSQ PRESCRIBED TOTAL DOSE: 4256 CGRAY
RAD ONC MSQ PRESCRIPTION PATTERN COMMENT: NORMAL
RAD ONC MSQ START DATE: NORMAL
RAD ONC MSQ TREATMENT COURSE NUMBER: 1
RAD ONC MSQ TREATMENT SITE: NORMAL

## 2023-12-14 PROCEDURE — 77412 RADIATION TX DELIVERY LVL 3: CPT | Performed by: RADIOLOGY

## 2023-12-15 ENCOUNTER — HOSPITAL ENCOUNTER (OUTPATIENT)
Dept: RADIATION ONCOLOGY | Facility: CLINIC | Age: 73
Setting detail: RADIATION/ONCOLOGY SERIES
Discharge: HOME | End: 2023-12-15
Payer: MEDICARE

## 2023-12-15 ENCOUNTER — RADIATION ONCOLOGY OTV (OUTPATIENT)
Dept: RADIATION ONCOLOGY | Facility: CLINIC | Age: 73
End: 2023-12-15
Payer: MEDICARE

## 2023-12-15 VITALS
BODY MASS INDEX: 31.19 KG/M2 | TEMPERATURE: 97.2 F | SYSTOLIC BLOOD PRESSURE: 146 MMHG | RESPIRATION RATE: 16 BRPM | DIASTOLIC BLOOD PRESSURE: 82 MMHG | HEART RATE: 70 BPM | WEIGHT: 167.33 LBS | OXYGEN SATURATION: 98 %

## 2023-12-15 DIAGNOSIS — C50.411 MALIGNANT NEOPLASM OF UPPER-OUTER QUADRANT OF RIGHT FEMALE BREAST (MULTI): ICD-10-CM

## 2023-12-15 DIAGNOSIS — Z51.0 ENCOUNTER FOR ANTINEOPLASTIC RADIATION THERAPY: ICD-10-CM

## 2023-12-15 LAB
RAD ONC MSQ ACTUAL FRACTIONS DELIVERED: 13
RAD ONC MSQ ACTUAL SESSION DELIVERED DOSE: 266 CGRAY
RAD ONC MSQ ACTUAL TOTAL DOSE: 3458 CGRAY
RAD ONC MSQ ELAPSED DAYS: 18
RAD ONC MSQ LAST DATE: NORMAL
RAD ONC MSQ PRESCRIBED FRACTIONAL DOSE: 266 CGRAY
RAD ONC MSQ PRESCRIBED NUMBER OF FRACTIONS: 16
RAD ONC MSQ PRESCRIBED TECHNIQUE: NORMAL
RAD ONC MSQ PRESCRIBED TOTAL DOSE: 4256 CGRAY
RAD ONC MSQ PRESCRIPTION PATTERN COMMENT: NORMAL
RAD ONC MSQ START DATE: NORMAL
RAD ONC MSQ TREATMENT COURSE NUMBER: 1
RAD ONC MSQ TREATMENT SITE: NORMAL

## 2023-12-15 PROCEDURE — 77412 RADIATION TX DELIVERY LVL 3: CPT | Performed by: RADIOLOGY

## 2023-12-15 PROCEDURE — 77427 RADIATION TX MANAGEMENT X5: CPT | Performed by: RADIOLOGY

## 2023-12-15 ASSESSMENT — PAIN SCALES - GENERAL: PAINLEVEL: 0-NO PAIN

## 2023-12-15 NOTE — PROGRESS NOTES
Radiation Oncology On Treatment Visit    Patient Name:  Amy Driver  MRN:  [id number]  :  1950    Referring Provider: No ref. provider found  Primary Care Provider: Reyna Ma DO  Care Team: Patient Care Team:  Reyna Ma DO as PCP - General  Reyna Ma DO as PCP - [employer] Advantage PCP  Nannette Albert MD as Consulting Physician (Hematology and Oncology)    Date of Service: 12/15/2023     Diagnosis:   Specialty Problems          Radiation Oncology Problems    Breast cancer (CMS/HCC)        Malignant neoplasm of upper-outer quadrant of right breast in female, estrogen receptor positive (CMS/HCC)         Treatment Summary:  Radiation Treatments       Active   Right breast (Started on 2023)   Most recent fraction: 266 cGy given on 12/15/2023   Total given: 3,458 cGy / 4,256 cGy  (13 of 16 fractions)   Elapsed Days: 18   Technique: Opposed Tangential           Completed   No historical radiation treatments to show.             SUBJECTIVE: No complaints.        OBJECTIVE:   Vital Signs:  /82 (BP Location: Right arm, Patient Position: Sitting, BP Cuff Size: Adult)   Pulse 70   Temp 36.2 °C (97.2 °F) (Temporal)   Resp 16   Wt 75.9 kg (167 lb 5.3 oz)   SpO2 98%   BMI 31.19 kg/m²    Pain Scale: The patient's current pain level was assessed.  They report currently having a pain of 0 out of 10.    Other Pertinent Findings: Mild hyperpigmentation, skin intact.    Toxicity Assessment          2023    12:39 2023    11:52 12/15/2023    15:22   Toxicity Assessment   Adverse Events Reviewed (WDL) Yes (Within Defined Limits) No (Exceptions to WDL) No (Exceptions to WDL)   Treatment Site Breast Breast Breast   Anorexia Grade 0 Grade 0 Grade 0   Anxiety Grade 0 Grade 0 Grade 0   Dehydration Grade 0 Grade 0 Grade 0   Depression Grade 0 Grade 0 Grade 0   Dermatitis Radiation Grade 0       given aquaphor and skintegrity and instructions of use. Grade 0       using skintegrity and  aquaphor Grade 0       using skintegrity and aquaphor   Diarrhea Grade 0 Grade 0 Grade 0   Fatigue Grade 0 Grade 0 Grade 0   Fibrosis Deep Connective Tissue Grade 0 Grade 0 Grade 0   Fracture Grade 0 Grade 0 Grade 0   Nausea Grade 0 Grade 0 Grade 0   Pain Grade 0 Grade 0 Grade 0   Treatment Related Secondary Malignancy Grade 0 Grade 0 Grade 0   Tumor Pain Grade 0 Grade 0 Grade 0   Vomiting Grade 0 Grade 0 Grade 0   Abdominal Pain Grade 0     Dysphagia Grade 0     Esophagitis Grade 0     Gastric Hemorrhage Grade 0     Mucositis Oral Grade 0     Dry Mouth Grade 0 Grade 0 Grade 0   Breast Infection Grade 0 Grade 0 Grade 0   Seroma Grade 0 Grade 0 Grade 0   Joint Range of Motion Decreased Grade 0 Grade 0 Grade 0   Joint Range of Motion Decreased Lumbar Spine Grade 0 Grade 0 Grade 0   Brachial Plexopathy Grade 0 Grade 0 Grade 0   Breast Atrophy Grade 0 Grade 0 Grade 0   Breast Pain Grade 0 Grade 0 Grade 0   Nipple Deformity Grade 0 Grade 0 Grade 0   Pneumonitis Grade 0 Grade 0 Grade 0   Edema Limbs Grade 0 Grade 0 Grade 0   Lymphedema Grade 0 Grade 0 Grade 0   Thromboembolic Event Grade 0 Grade 0 Grade 0   Hot Flashes Grade 0 Grade 0 Grade 0        Assessment / Plan:  The patient is tolerating radiation therapy as anticipated.  Continue per current treatment plan.   Chart and films reviewed and approved.

## 2023-12-18 ENCOUNTER — HOSPITAL ENCOUNTER (OUTPATIENT)
Dept: RADIATION ONCOLOGY | Facility: CLINIC | Age: 73
Setting detail: RADIATION/ONCOLOGY SERIES
Discharge: HOME | End: 2023-12-18
Payer: MEDICARE

## 2023-12-18 DIAGNOSIS — C50.411 MALIGNANT NEOPLASM OF UPPER-OUTER QUADRANT OF RIGHT FEMALE BREAST (MULTI): ICD-10-CM

## 2023-12-18 DIAGNOSIS — Z51.0 ENCOUNTER FOR ANTINEOPLASTIC RADIATION THERAPY: ICD-10-CM

## 2023-12-18 LAB
RAD ONC MSQ ACTUAL FRACTIONS DELIVERED: 14
RAD ONC MSQ ACTUAL SESSION DELIVERED DOSE: 266 CGRAY
RAD ONC MSQ ACTUAL TOTAL DOSE: 3724 CGRAY
RAD ONC MSQ ELAPSED DAYS: 21
RAD ONC MSQ LAST DATE: NORMAL
RAD ONC MSQ PRESCRIBED FRACTIONAL DOSE: 266 CGRAY
RAD ONC MSQ PRESCRIBED NUMBER OF FRACTIONS: 16
RAD ONC MSQ PRESCRIBED TECHNIQUE: NORMAL
RAD ONC MSQ PRESCRIBED TOTAL DOSE: 4256 CGRAY
RAD ONC MSQ PRESCRIPTION PATTERN COMMENT: NORMAL
RAD ONC MSQ START DATE: NORMAL
RAD ONC MSQ TREATMENT COURSE NUMBER: 1
RAD ONC MSQ TREATMENT SITE: NORMAL

## 2023-12-18 PROCEDURE — 77412 RADIATION TX DELIVERY LVL 3: CPT | Performed by: RADIOLOGY

## 2023-12-19 ENCOUNTER — HOSPITAL ENCOUNTER (OUTPATIENT)
Dept: RADIATION ONCOLOGY | Facility: CLINIC | Age: 73
Setting detail: RADIATION/ONCOLOGY SERIES
Discharge: HOME | End: 2023-12-19
Payer: MEDICARE

## 2023-12-19 DIAGNOSIS — C50.411 MALIGNANT NEOPLASM OF UPPER-OUTER QUADRANT OF RIGHT FEMALE BREAST (MULTI): ICD-10-CM

## 2023-12-19 DIAGNOSIS — Z51.0 ENCOUNTER FOR ANTINEOPLASTIC RADIATION THERAPY: ICD-10-CM

## 2023-12-19 LAB
RAD ONC MSQ ACTUAL FRACTIONS DELIVERED: 15
RAD ONC MSQ ACTUAL SESSION DELIVERED DOSE: 266 CGRAY
RAD ONC MSQ ACTUAL TOTAL DOSE: 3990 CGRAY
RAD ONC MSQ ELAPSED DAYS: 22
RAD ONC MSQ LAST DATE: NORMAL
RAD ONC MSQ PRESCRIBED FRACTIONAL DOSE: 266 CGRAY
RAD ONC MSQ PRESCRIBED NUMBER OF FRACTIONS: 16
RAD ONC MSQ PRESCRIBED TECHNIQUE: NORMAL
RAD ONC MSQ PRESCRIBED TOTAL DOSE: 4256 CGRAY
RAD ONC MSQ PRESCRIPTION PATTERN COMMENT: NORMAL
RAD ONC MSQ START DATE: NORMAL
RAD ONC MSQ TREATMENT COURSE NUMBER: 1
RAD ONC MSQ TREATMENT SITE: NORMAL

## 2023-12-19 PROCEDURE — 77412 RADIATION TX DELIVERY LVL 3: CPT | Performed by: RADIOLOGY

## 2023-12-20 ENCOUNTER — HOSPITAL ENCOUNTER (OUTPATIENT)
Dept: RADIATION ONCOLOGY | Facility: CLINIC | Age: 73
Setting detail: RADIATION/ONCOLOGY SERIES
Discharge: HOME | End: 2023-12-20
Payer: MEDICARE

## 2023-12-20 DIAGNOSIS — C50.411 MALIGNANT NEOPLASM OF UPPER-OUTER QUADRANT OF RIGHT FEMALE BREAST (MULTI): ICD-10-CM

## 2023-12-20 DIAGNOSIS — Z51.0 ENCOUNTER FOR ANTINEOPLASTIC RADIATION THERAPY: ICD-10-CM

## 2023-12-20 LAB
RAD ONC MSQ ACTUAL FRACTIONS DELIVERED: 16
RAD ONC MSQ ACTUAL SESSION DELIVERED DOSE: 266 CGRAY
RAD ONC MSQ ACTUAL TOTAL DOSE: 4256 CGRAY
RAD ONC MSQ ELAPSED DAYS: 23
RAD ONC MSQ LAST DATE: NORMAL
RAD ONC MSQ PRESCRIBED FRACTIONAL DOSE: 266 CGRAY
RAD ONC MSQ PRESCRIBED NUMBER OF FRACTIONS: 16
RAD ONC MSQ PRESCRIBED TECHNIQUE: NORMAL
RAD ONC MSQ PRESCRIBED TOTAL DOSE: 4256 CGRAY
RAD ONC MSQ PRESCRIPTION PATTERN COMMENT: NORMAL
RAD ONC MSQ START DATE: NORMAL
RAD ONC MSQ TREATMENT COURSE NUMBER: 1
RAD ONC MSQ TREATMENT SITE: NORMAL

## 2023-12-20 PROCEDURE — 77280 THER RAD SIMULAJ FIELD SMPL: CPT | Performed by: STUDENT IN AN ORGANIZED HEALTH CARE EDUCATION/TRAINING PROGRAM

## 2023-12-20 PROCEDURE — 77412 RADIATION TX DELIVERY LVL 3: CPT | Performed by: RADIOLOGY

## 2023-12-21 ENCOUNTER — HOSPITAL ENCOUNTER (OUTPATIENT)
Dept: RADIATION ONCOLOGY | Facility: CLINIC | Age: 73
Setting detail: RADIATION/ONCOLOGY SERIES
Discharge: HOME | End: 2023-12-21
Payer: MEDICARE

## 2023-12-21 DIAGNOSIS — Z51.0 ENCOUNTER FOR ANTINEOPLASTIC RADIATION THERAPY: ICD-10-CM

## 2023-12-21 DIAGNOSIS — C50.411 MALIGNANT NEOPLASM OF UPPER-OUTER QUADRANT OF RIGHT FEMALE BREAST (MULTI): ICD-10-CM

## 2023-12-21 LAB
RAD ONC MSQ ACTUAL FRACTIONS DELIVERED: 1
RAD ONC MSQ ACTUAL SESSION DELIVERED DOSE: 250 CGRAY
RAD ONC MSQ ACTUAL TOTAL DOSE: 250 CGRAY
RAD ONC MSQ ELAPSED DAYS: 0
RAD ONC MSQ LAST DATE: NORMAL
RAD ONC MSQ PRESCRIBED FRACTIONAL DOSE: 250 CGRAY
RAD ONC MSQ PRESCRIBED NUMBER OF FRACTIONS: 4
RAD ONC MSQ PRESCRIBED TECHNIQUE: NORMAL
RAD ONC MSQ PRESCRIBED TOTAL DOSE: 1000 CGRAY
RAD ONC MSQ START DATE: NORMAL
RAD ONC MSQ TREATMENT COURSE NUMBER: 1
RAD ONC MSQ TREATMENT SITE: NORMAL

## 2023-12-21 PROCEDURE — 77412 RADIATION TX DELIVERY LVL 3: CPT | Performed by: RADIOLOGY

## 2023-12-22 ENCOUNTER — RADIATION ONCOLOGY OTV (OUTPATIENT)
Dept: RADIATION ONCOLOGY | Facility: CLINIC | Age: 73
End: 2023-12-22

## 2023-12-22 VITALS
HEART RATE: 84 BPM | SYSTOLIC BLOOD PRESSURE: 152 MMHG | RESPIRATION RATE: 17 BRPM | DIASTOLIC BLOOD PRESSURE: 82 MMHG | TEMPERATURE: 98.5 F | WEIGHT: 166.7 LBS | BODY MASS INDEX: 31.07 KG/M2 | OXYGEN SATURATION: 99 %

## 2023-12-22 ASSESSMENT — PAIN SCALES - GENERAL: PAINLEVEL: 0-NO PAIN

## 2023-12-22 NOTE — PROGRESS NOTES
Radiation Oncology On Treatment Visit    Patient Name:  Amy Driver  MRN:  [id number]  :  1950    Referring Provider: No ref. provider found  Primary Care Provider: Reyna Ma DO  Care Team: Patient Care Team:  Reyna Ma DO as PCP - General  Nannette Albert MD as PCP - MMO Medicare Advantage PCP  Nannette Albert MD as Consulting Physician (Hematology and Oncology)    Date of Service: 2023     Diagnosis:   Specialty Problems          Radiation Oncology Problems    Breast cancer (CMS/HCC)        Malignant neoplasm of upper-outer quadrant of right breast in female, estrogen receptor positive (CMS/HCC)         Treatment Summary:  Radiation Treatments       Active   Rt TB (Started on 2023)   Most recent fraction: 250 cGy given on 2023   Total given: 250 cGy / 1,000 cGy  (1 of 4 fractions)   Elapsed Days: 0   Technique: 3-Field Boost           Completed   Right breast (Started on 2023)   Most recent fraction: 266 cGy given on 2023   Total given: 4,256 cGy / 4,256 cGy  (16 of 16 fractions)   Elapsed Days: 23   Technique: Opposed Tangential                   SUBJECTIVE: No complaints. Reports some skin changes.       OBJECTIVE: mild hyperpigmentation with superficial desquamation in the fold.   Vital Signs:  /82 (BP Location: Right arm, Patient Position: Sitting)   Pulse 84   Temp 36.9 °C (98.5 °F)   Resp 17   Wt 75.6 kg (166 lb 11.2 oz)   SpO2 99%   BMI 31.07 kg/m²    Pain Scale: The patient's current pain level was assessed.  They report currently having a pain of 0 out of 10.    Other Pertinent Findings:     Toxicity Assessment          2023    12:39 2023    11:52 12/15/2023    15:22 2023    12:35   Toxicity Assessment   Adverse Events Reviewed (WDL) Yes (Within Defined Limits) No (Exceptions to WDL) No (Exceptions to WDL) No (Exceptions to WDL)   Treatment Site Breast Breast Breast Breast   Anorexia Grade 0 Grade 0 Grade 0 Grade 0   Anxiety  Grade 0 Grade 0 Grade 0 Grade 0   Dehydration Grade 0 Grade 0 Grade 0 Grade 0   Depression Grade 0 Grade 0 Grade 0 Grade 0   Dermatitis Radiation Grade 0       given aquaphor and skintegrity and instructions of use. Grade 0       using skintegrity and aquaphor Grade 0       using skintegrity and aquaphor Grade 1       using aquaphor and skintegrity   Diarrhea Grade 0 Grade 0 Grade 0 Grade 0   Fatigue Grade 0 Grade 0 Grade 0 Grade 0   Fibrosis Deep Connective Tissue Grade 0 Grade 0 Grade 0 Grade 0   Fracture Grade 0 Grade 0 Grade 0 Grade 0   Nausea Grade 0 Grade 0 Grade 0 Grade 0   Pain Grade 0 Grade 0 Grade 0 Grade 0   Treatment Related Secondary Malignancy Grade 0 Grade 0 Grade 0 Grade 0   Tumor Pain Grade 0 Grade 0 Grade 0 Grade 0   Vomiting Grade 0 Grade 0 Grade 0 Grade 0   Abdominal Pain Grade 0      Dysphagia Grade 0      Esophagitis Grade 0      Gastric Hemorrhage Grade 0      Mucositis Oral Grade 0      Dry Mouth Grade 0 Grade 0 Grade 0 Grade 0   Breast Infection Grade 0 Grade 0 Grade 0 Grade 0   Seroma Grade 0 Grade 0 Grade 0 Grade 0   Joint Range of Motion Decreased Grade 0 Grade 0 Grade 0 Grade 0   Joint Range of Motion Decreased Lumbar Spine Grade 0 Grade 0 Grade 0 Grade 0   Brachial Plexopathy Grade 0 Grade 0 Grade 0 Grade 0   Breast Atrophy Grade 0 Grade 0 Grade 0 Grade 0   Breast Pain Grade 0 Grade 0 Grade 0 Grade 0   Nipple Deformity Grade 0 Grade 0 Grade 0 Grade 0   Pneumonitis Grade 0 Grade 0 Grade 0 Grade 0   Edema Limbs Grade 0 Grade 0 Grade 0 Grade 0   Lymphedema Grade 0 Grade 0 Grade 0 Grade 0   Thromboembolic Event Grade 0 Grade 0 Grade 0 Grade 0   Hot Flashes Grade 0 Grade 0 Grade 0 Grade 0        Assessment / Plan:  The patient is tolerating radiation therapy as anticipated.  Continue per current treatment plan.

## 2023-12-26 ENCOUNTER — HOSPITAL ENCOUNTER (OUTPATIENT)
Dept: RADIATION ONCOLOGY | Facility: CLINIC | Age: 73
Setting detail: RADIATION/ONCOLOGY SERIES
Discharge: HOME | End: 2023-12-26
Payer: MEDICARE

## 2023-12-26 DIAGNOSIS — C50.411 MALIGNANT NEOPLASM OF UPPER-OUTER QUADRANT OF RIGHT FEMALE BREAST (MULTI): ICD-10-CM

## 2023-12-26 DIAGNOSIS — Z51.0 ENCOUNTER FOR ANTINEOPLASTIC RADIATION THERAPY: ICD-10-CM

## 2023-12-26 LAB
RAD ONC MSQ ACTUAL FRACTIONS DELIVERED: 3
RAD ONC MSQ ACTUAL SESSION DELIVERED DOSE: 250 CGRAY
RAD ONC MSQ ACTUAL TOTAL DOSE: 750 CGRAY
RAD ONC MSQ ELAPSED DAYS: 5
RAD ONC MSQ LAST DATE: NORMAL
RAD ONC MSQ PRESCRIBED FRACTIONAL DOSE: 250 CGRAY
RAD ONC MSQ PRESCRIBED NUMBER OF FRACTIONS: 4
RAD ONC MSQ PRESCRIBED TECHNIQUE: NORMAL
RAD ONC MSQ PRESCRIBED TOTAL DOSE: 1000 CGRAY
RAD ONC MSQ START DATE: NORMAL
RAD ONC MSQ TREATMENT COURSE NUMBER: 1
RAD ONC MSQ TREATMENT SITE: NORMAL

## 2023-12-26 PROCEDURE — 77412 RADIATION TX DELIVERY LVL 3: CPT | Performed by: RADIOLOGY

## 2023-12-27 ENCOUNTER — HOSPITAL ENCOUNTER (OUTPATIENT)
Dept: RADIATION ONCOLOGY | Facility: CLINIC | Age: 73
Setting detail: RADIATION/ONCOLOGY SERIES
Discharge: HOME | End: 2023-12-27
Payer: MEDICARE

## 2023-12-27 ENCOUNTER — DOCUMENTATION (OUTPATIENT)
Dept: RADIATION ONCOLOGY | Facility: CLINIC | Age: 73
End: 2023-12-27
Payer: MEDICARE

## 2023-12-27 DIAGNOSIS — Z51.0 ENCOUNTER FOR ANTINEOPLASTIC RADIATION THERAPY: ICD-10-CM

## 2023-12-27 DIAGNOSIS — C50.411 MALIGNANT NEOPLASM OF UPPER-OUTER QUADRANT OF RIGHT FEMALE BREAST (MULTI): ICD-10-CM

## 2023-12-27 LAB
RAD ONC MSQ ACTUAL FRACTIONS DELIVERED: 4
RAD ONC MSQ ACTUAL SESSION DELIVERED DOSE: 250 CGRAY
RAD ONC MSQ ACTUAL TOTAL DOSE: 1000 CGRAY
RAD ONC MSQ ELAPSED DAYS: 6
RAD ONC MSQ LAST DATE: NORMAL
RAD ONC MSQ PRESCRIBED FRACTIONAL DOSE: 250 CGRAY
RAD ONC MSQ PRESCRIBED NUMBER OF FRACTIONS: 4
RAD ONC MSQ PRESCRIBED TECHNIQUE: NORMAL
RAD ONC MSQ PRESCRIBED TOTAL DOSE: 1000 CGRAY
RAD ONC MSQ START DATE: NORMAL
RAD ONC MSQ TREATMENT COURSE NUMBER: 1
RAD ONC MSQ TREATMENT SITE: NORMAL

## 2023-12-27 PROCEDURE — 77412 RADIATION TX DELIVERY LVL 3: CPT | Performed by: RADIOLOGY

## 2023-12-28 NOTE — PROGRESS NOTES
Radiation Oncology Treatment Summary    Patient Name:  Amy Driver  MRN:  [id number]  :  1950    Referring Provider: Concepcion Elizondo MD  Primary Care Provider: Reyna Ma DO    Brief History: Amy Driver is a 73 y.o. female with Breast cancer (CMS/HCC), Pathologic: Stage Unknown (pT1c, pNX, cM0, G3, ER+, NH+, HER2-, Oncotype DX score: 23).  The patient completed radiotherapy as outlined below.    Radiation Treatment Summary:    Radiation Treatments      Radiation Treatments       Active   No active radiation treatments to show.     Completed   Right breast (Started on 2023)   Most recent fraction: 266 cGy given on 2023   Total given: 4,256 cGy / 4,256 cGy  (16 of 16 fractions)   Elapsed Days: 23   Technique: Opposed Tangential        Rt TB (Started on 2023)   Most recent fraction: 250 cGy given on 2023   Total given: 1,000 cGy / 1,000 cGy  (4 of 4 fractions)   Elapsed Days: 6   Technique: 3-Field Boost                             Please see the patient's [facility] chart for further details regarding the radiation plan, including beam energy.    Concurrent Chemotherapy:  Treatment Plans       No treatment plans exist          CTCAE Toxicity Overview:   Toxicity Assessment          2023    12:39 2023    11:52 12/15/2023    15:22 2023    12:35   Toxicity Assessment   Adverse Events Reviewed (WDL) Yes (Within Defined Limits) No (Exceptions to WDL) No (Exceptions to WDL) No (Exceptions to WDL)   Treatment Site Breast Breast Breast Breast   Anorexia Grade 0 Grade 0 Grade 0 Grade 0   Anxiety Grade 0 Grade 0 Grade 0 Grade 0   Dehydration Grade 0 Grade 0 Grade 0 Grade 0   Depression Grade 0 Grade 0 Grade 0 Grade 0   Dermatitis Radiation Grade 0       given aquaphor and skintegrity and instructions of use. Grade 0       using skintegrity and aquaphor Grade 0       using skintegrity and aquaphor Grade 1       using aquaphor and skintegrity   Diarrhea Grade 0 Grade 0 Grade 0  Grade 0   Fatigue Grade 0 Grade 0 Grade 0 Grade 0   Fibrosis Deep Connective Tissue Grade 0 Grade 0 Grade 0 Grade 0   Fracture Grade 0 Grade 0 Grade 0 Grade 0   Nausea Grade 0 Grade 0 Grade 0 Grade 0   Pain Grade 0 Grade 0 Grade 0 Grade 0   Treatment Related Secondary Malignancy Grade 0 Grade 0 Grade 0 Grade 0   Tumor Pain Grade 0 Grade 0 Grade 0 Grade 0   Vomiting Grade 0 Grade 0 Grade 0 Grade 0   Abdominal Pain Grade 0      Dysphagia Grade 0      Esophagitis Grade 0      Gastric Hemorrhage Grade 0      Mucositis Oral Grade 0      Dry Mouth Grade 0 Grade 0 Grade 0 Grade 0   Breast Infection Grade 0 Grade 0 Grade 0 Grade 0   Seroma Grade 0 Grade 0 Grade 0 Grade 0   Joint Range of Motion Decreased Grade 0 Grade 0 Grade 0 Grade 0   Joint Range of Motion Decreased Lumbar Spine Grade 0 Grade 0 Grade 0 Grade 0   Brachial Plexopathy Grade 0 Grade 0 Grade 0 Grade 0   Breast Atrophy Grade 0 Grade 0 Grade 0 Grade 0   Breast Pain Grade 0 Grade 0 Grade 0 Grade 0   Nipple Deformity Grade 0 Grade 0 Grade 0 Grade 0   Pneumonitis Grade 0 Grade 0 Grade 0 Grade 0   Edema Limbs Grade 0 Grade 0 Grade 0 Grade 0   Lymphedema Grade 0 Grade 0 Grade 0 Grade 0   Thromboembolic Event Grade 0 Grade 0 Grade 0 Grade 0   Hot Flashes Grade 0 Grade 0 Grade 0 Grade 0     Patient Disposition: Ms. Driver will return to the clinic on 2/02/2024 to follow up with Lyn Ramirez CNP.  Ms. Driver is instructed to notify the clinic with any concerns prior to the follow-up appointment.

## 2024-01-09 DIAGNOSIS — K22.70 BARRETT'S ESOPHAGUS WITHOUT DYSPLASIA: ICD-10-CM

## 2024-01-09 DIAGNOSIS — K21.9 GASTROESOPHAGEAL REFLUX DISEASE WITHOUT ESOPHAGITIS: ICD-10-CM

## 2024-01-10 RX ORDER — ESOMEPRAZOLE MAGNESIUM 40 MG/1
40 CAPSULE, DELAYED RELEASE ORAL
Qty: 90 CAPSULE | Refills: 0 | Status: SHIPPED | OUTPATIENT
Start: 2024-01-10 | End: 2024-01-12 | Stop reason: SDUPTHER

## 2024-01-12 ENCOUNTER — LAB (OUTPATIENT)
Dept: LAB | Facility: LAB | Age: 74
End: 2024-01-12
Payer: MEDICARE

## 2024-01-12 ENCOUNTER — OFFICE VISIT (OUTPATIENT)
Dept: PRIMARY CARE | Facility: CLINIC | Age: 74
End: 2024-01-12
Payer: MEDICARE

## 2024-01-12 VITALS
OXYGEN SATURATION: 97 % | TEMPERATURE: 97.4 F | HEIGHT: 61 IN | HEART RATE: 74 BPM | DIASTOLIC BLOOD PRESSURE: 54 MMHG | RESPIRATION RATE: 16 BRPM | WEIGHT: 167.4 LBS | BODY MASS INDEX: 31.6 KG/M2 | SYSTOLIC BLOOD PRESSURE: 130 MMHG

## 2024-01-12 DIAGNOSIS — I12.9 SECONDARY DM WITH CKD STAGE 3 AND HYPERTENSION (MULTI): ICD-10-CM

## 2024-01-12 DIAGNOSIS — E61.1 IRON DEFICIENCY: ICD-10-CM

## 2024-01-12 DIAGNOSIS — I50.9 HEART FAILURE, UNSPECIFIED HF CHRONICITY, UNSPECIFIED HEART FAILURE TYPE (MULTI): ICD-10-CM

## 2024-01-12 DIAGNOSIS — C50.411 MALIGNANT NEOPLASM OF UPPER-OUTER QUADRANT OF RIGHT BREAST IN FEMALE, ESTROGEN RECEPTOR POSITIVE (MULTI): ICD-10-CM

## 2024-01-12 DIAGNOSIS — N18.30 SECONDARY DM WITH CKD STAGE 3 AND HYPERTENSION (MULTI): ICD-10-CM

## 2024-01-12 DIAGNOSIS — K21.9 GASTROESOPHAGEAL REFLUX DISEASE WITHOUT ESOPHAGITIS: ICD-10-CM

## 2024-01-12 DIAGNOSIS — K22.70 BARRETT'S ESOPHAGUS WITHOUT DYSPLASIA: ICD-10-CM

## 2024-01-12 DIAGNOSIS — Z00.00 ENCOUNTER FOR MEDICARE ANNUAL WELLNESS EXAM: Primary | ICD-10-CM

## 2024-01-12 DIAGNOSIS — I10 ESSENTIAL HYPERTENSION WITH GOAL BLOOD PRESSURE LESS THAN 130/80: ICD-10-CM

## 2024-01-12 DIAGNOSIS — Z13.89 ENCOUNTER FOR SCREENING FOR OTHER DISORDER: ICD-10-CM

## 2024-01-12 DIAGNOSIS — Z17.0 MALIGNANT NEOPLASM OF UPPER-OUTER QUADRANT OF RIGHT BREAST IN FEMALE, ESTROGEN RECEPTOR POSITIVE (MULTI): ICD-10-CM

## 2024-01-12 DIAGNOSIS — I70.0 AORTIC ATHEROSCLEROSIS (CMS-HCC): ICD-10-CM

## 2024-01-12 DIAGNOSIS — N18.32 CHRONIC KIDNEY DISEASE, STAGE 3B (MULTI): ICD-10-CM

## 2024-01-12 DIAGNOSIS — E13.22 SECONDARY DM WITH CKD STAGE 3 AND HYPERTENSION (MULTI): ICD-10-CM

## 2024-01-12 DIAGNOSIS — E66.9 CLASS 1 OBESITY WITH SERIOUS COMORBIDITY AND BODY MASS INDEX (BMI) OF 31.0 TO 31.9 IN ADULT, UNSPECIFIED OBESITY TYPE: ICD-10-CM

## 2024-01-12 DIAGNOSIS — E78.5 HYPERLIPIDEMIA, UNSPECIFIED HYPERLIPIDEMIA TYPE: ICD-10-CM

## 2024-01-12 DIAGNOSIS — J42 CHRONIC BRONCHITIS, UNSPECIFIED CHRONIC BRONCHITIS TYPE (MULTI): ICD-10-CM

## 2024-01-12 PROBLEM — Z86.79 HISTORY OF HYPERTENSION: Status: ACTIVE | Noted: 2024-01-12

## 2024-01-12 PROBLEM — J18.9 PNEUMONIA: Status: ACTIVE | Noted: 2024-01-12

## 2024-01-12 LAB
ALBUMIN SERPL BCP-MCNC: 4.4 G/DL (ref 3.4–5)
ALP SERPL-CCNC: 62 U/L (ref 33–136)
ALT SERPL W P-5'-P-CCNC: 18 U/L (ref 7–45)
ANION GAP SERPL CALC-SCNC: 17 MMOL/L (ref 10–20)
AST SERPL W P-5'-P-CCNC: 18 U/L (ref 9–39)
BASOPHILS # BLD AUTO: 0.03 X10*3/UL (ref 0–0.1)
BASOPHILS NFR BLD AUTO: 0.5 %
BILIRUB SERPL-MCNC: 0.4 MG/DL (ref 0–1.2)
BUN SERPL-MCNC: 20 MG/DL (ref 6–23)
CALCIUM SERPL-MCNC: 10.4 MG/DL (ref 8.6–10.6)
CHLORIDE SERPL-SCNC: 101 MMOL/L (ref 98–107)
CHOLEST SERPL-MCNC: 117 MG/DL (ref 0–199)
CHOLESTEROL/HDL RATIO: 3.5
CO2 SERPL-SCNC: 26 MMOL/L (ref 21–32)
CREAT SERPL-MCNC: 1.21 MG/DL (ref 0.5–1.05)
CREAT UR-MCNC: 56.5 MG/DL (ref 20–320)
EGFRCR SERPLBLD CKD-EPI 2021: 47 ML/MIN/1.73M*2
EOSINOPHIL # BLD AUTO: 0.19 X10*3/UL (ref 0–0.4)
EOSINOPHIL NFR BLD AUTO: 3 %
ERYTHROCYTE [DISTWIDTH] IN BLOOD BY AUTOMATED COUNT: 12.7 % (ref 11.5–14.5)
EST. AVERAGE GLUCOSE BLD GHB EST-MCNC: 160 MG/DL
FERRITIN SERPL-MCNC: 18 NG/ML (ref 8–150)
GLUCOSE SERPL-MCNC: 111 MG/DL (ref 74–99)
HBA1C MFR BLD: 7.2 %
HCT VFR BLD AUTO: 37.8 % (ref 36–46)
HDLC SERPL-MCNC: 33.8 MG/DL
HGB BLD-MCNC: 12.8 G/DL (ref 12–16)
IMM GRANULOCYTES # BLD AUTO: 0.03 X10*3/UL (ref 0–0.5)
IMM GRANULOCYTES NFR BLD AUTO: 0.5 % (ref 0–0.9)
IRON SATN MFR SERPL: 17 % (ref 25–45)
IRON SERPL-MCNC: 66 UG/DL (ref 35–150)
LDLC SERPL CALC-MCNC: 32 MG/DL
LYMPHOCYTES # BLD AUTO: 1.26 X10*3/UL (ref 0.8–3)
LYMPHOCYTES NFR BLD AUTO: 20 %
MCH RBC QN AUTO: 30.4 PG (ref 26–34)
MCHC RBC AUTO-ENTMCNC: 33.9 G/DL (ref 32–36)
MCV RBC AUTO: 90 FL (ref 80–100)
MICROALBUMIN UR-MCNC: <7 MG/L
MICROALBUMIN/CREAT UR: NORMAL MG/G{CREAT}
MONOCYTES # BLD AUTO: 0.76 X10*3/UL (ref 0.05–0.8)
MONOCYTES NFR BLD AUTO: 12 %
NEUTROPHILS # BLD AUTO: 4.04 X10*3/UL (ref 1.6–5.5)
NEUTROPHILS NFR BLD AUTO: 64 %
NON HDL CHOLESTEROL: 83 MG/DL (ref 0–149)
NRBC BLD-RTO: 0 /100 WBCS (ref 0–0)
PLATELET # BLD AUTO: 206 X10*3/UL (ref 150–450)
POTASSIUM SERPL-SCNC: 4.2 MMOL/L (ref 3.5–5.3)
PROT SERPL-MCNC: 8 G/DL (ref 6.4–8.2)
RBC # BLD AUTO: 4.21 X10*6/UL (ref 4–5.2)
SODIUM SERPL-SCNC: 140 MMOL/L (ref 136–145)
TIBC SERPL-MCNC: 389 UG/DL (ref 240–445)
TRIGL SERPL-MCNC: 257 MG/DL (ref 0–149)
TSH SERPL-ACNC: 1.97 MIU/L (ref 0.44–3.98)
UIBC SERPL-MCNC: 323 UG/DL (ref 110–370)
VLDL: 51 MG/DL (ref 0–40)
WBC # BLD AUTO: 6.3 X10*3/UL (ref 4.4–11.3)

## 2024-01-12 PROCEDURE — G0439 PPPS, SUBSEQ VISIT: HCPCS | Performed by: FAMILY MEDICINE

## 2024-01-12 PROCEDURE — 1159F MED LIST DOCD IN RCRD: CPT | Performed by: FAMILY MEDICINE

## 2024-01-12 PROCEDURE — 83036 HEMOGLOBIN GLYCOSYLATED A1C: CPT

## 2024-01-12 PROCEDURE — 3062F POS MACROALBUMINURIA REV: CPT | Performed by: FAMILY MEDICINE

## 2024-01-12 PROCEDURE — 80061 LIPID PANEL: CPT

## 2024-01-12 PROCEDURE — 36415 COLL VENOUS BLD VENIPUNCTURE: CPT

## 2024-01-12 PROCEDURE — 82570 ASSAY OF URINE CREATININE: CPT

## 2024-01-12 PROCEDURE — G0444 DEPRESSION SCREEN ANNUAL: HCPCS | Performed by: FAMILY MEDICINE

## 2024-01-12 PROCEDURE — 3078F DIAST BP <80 MM HG: CPT | Performed by: FAMILY MEDICINE

## 2024-01-12 PROCEDURE — 3051F HG A1C>EQUAL 7.0%<8.0%: CPT | Performed by: FAMILY MEDICINE

## 2024-01-12 PROCEDURE — 82728 ASSAY OF FERRITIN: CPT

## 2024-01-12 PROCEDURE — 85025 COMPLETE CBC W/AUTO DIFF WBC: CPT

## 2024-01-12 PROCEDURE — 82043 UR ALBUMIN QUANTITATIVE: CPT

## 2024-01-12 PROCEDURE — 80053 COMPREHEN METABOLIC PANEL: CPT

## 2024-01-12 PROCEDURE — 4010F ACE/ARB THERAPY RXD/TAKEN: CPT | Performed by: FAMILY MEDICINE

## 2024-01-12 PROCEDURE — 84443 ASSAY THYROID STIM HORMONE: CPT

## 2024-01-12 PROCEDURE — 3075F SYST BP GE 130 - 139MM HG: CPT | Performed by: FAMILY MEDICINE

## 2024-01-12 PROCEDURE — 3048F LDL-C <100 MG/DL: CPT | Performed by: FAMILY MEDICINE

## 2024-01-12 PROCEDURE — 83550 IRON BINDING TEST: CPT

## 2024-01-12 PROCEDURE — 3008F BODY MASS INDEX DOCD: CPT | Performed by: FAMILY MEDICINE

## 2024-01-12 PROCEDURE — 99397 PER PM REEVAL EST PAT 65+ YR: CPT | Performed by: FAMILY MEDICINE

## 2024-01-12 PROCEDURE — G0447 BEHAVIOR COUNSEL OBESITY 15M: HCPCS | Performed by: FAMILY MEDICINE

## 2024-01-12 PROCEDURE — 83540 ASSAY OF IRON: CPT

## 2024-01-12 PROCEDURE — 1126F AMNT PAIN NOTED NONE PRSNT: CPT | Performed by: FAMILY MEDICINE

## 2024-01-12 PROCEDURE — 1036F TOBACCO NON-USER: CPT | Performed by: FAMILY MEDICINE

## 2024-01-12 RX ORDER — AMLODIPINE BESYLATE 10 MG/1
10 TABLET ORAL DAILY
Qty: 90 TABLET | Refills: 3 | Status: SHIPPED | OUTPATIENT
Start: 2024-01-12 | End: 2024-04-10 | Stop reason: SDUPTHER

## 2024-01-12 RX ORDER — TRIAMTERENE AND HYDROCHLOROTHIAZIDE 75; 50 MG/1; MG/1
1 TABLET ORAL
COMMUNITY
Start: 2014-12-17

## 2024-01-12 RX ORDER — SUCRALFATE 1 G/1
TABLET ORAL EVERY 6 HOURS
COMMUNITY
Start: 2019-11-21

## 2024-01-12 RX ORDER — ESOMEPRAZOLE MAGNESIUM 40 MG/1
40 CAPSULE, DELAYED RELEASE ORAL
Qty: 90 CAPSULE | Refills: 3 | Status: SHIPPED | OUTPATIENT
Start: 2024-01-12

## 2024-01-12 ASSESSMENT — PATIENT HEALTH QUESTIONNAIRE - PHQ9
SUM OF ALL RESPONSES TO PHQ9 QUESTIONS 1 AND 2: 0
1. LITTLE INTEREST OR PLEASURE IN DOING THINGS: NOT AT ALL
2. FEELING DOWN, DEPRESSED OR HOPELESS: NOT AT ALL

## 2024-01-12 NOTE — PATIENT INSTRUCTIONS
Shingrix- from local pharm    Stop by the lab today      Please follow up in  1 yr for medicare wellness and 6 months DM/HTN or as needed.       ** If labs or imaging ordered at today's visit, all the non-urgent results will be discussed at your next visit    If you have been referred for a special test or to a specialist please call  [phone] to schedule an appointment.  If you have any further questions, or if develop new or worsened symptoms, please give our office a call at [phone].

## 2024-01-12 NOTE — ASSESSMENT & PLAN NOTE
Dm- rechecking today. Cont current med  HTN- a tgoal- cont meds  CKD- stable- rehcekcing labs today- gfr 40

## 2024-01-12 NOTE — PROGRESS NOTES
"Subjective   Reason for Visit: Amy Driver is an 73 y.o. female here for a Medicare Wellness visit.     Past Medical, Surgical, and Family History reviewed and updated in chart.         HPI    Patient Care Team:  Reyna Ma DO as PCP - General (Family Medicine)  Nannette Albert MD as PCP - MMO Medicare Advantage PCP  Nannette Albert MD as Consulting Physician (Hematology and Oncology)       Review of Systems  All systems reviewed and neg if not noted in the HPI above     Objective   Vitals:  /54 (Patient Position: Sitting)   Pulse 74   Temp 36.3 °C (97.4 °F)   Resp 16   Ht 1.56 m (5' 1.42\")   Wt 75.9 kg (167 lb 6.4 oz)   SpO2 97%   BMI 31.20 kg/m²       Physical Exam  Pleasant  Eyes: conjunctiva non-icteric and eye lids are without obvious rash or drooping. Pupils are symmetric.   Ears, Nose, Mouth, and Throat: External ears and nose appear to be without deformity or rash. No lesions or masses noted. Hearing is grossly intact.   CV: RRR, no murmur  Carotids: no bruits  Pulm:CTA B/L  Abd: soft, NTTP, + BS  LE: no edema  Psychiatric: Alert, orientation to person, place, and time. Recent/remote memory as evidenced through face-to-face interaction and discussion appear grossly intact. Mood and affect are normal.     Assessment/Plan   Problem List Items Addressed This Visit       Mills esophagus    Relevant Medications    esomeprazole (NexIUM) 40 mg DR capsule    Chronic kidney disease, stage 3b (CMS/HCC)  - improved- continue to monitor  - gfr 40    Essential hypertension with goal blood pressure less than 130/80    Relevant Medications    amLODIPine (Norvasc) 10 mg tablet    Gastroesophageal reflux disease without esophagitis    Relevant Medications    - refilled esomeprazole (NexIUM) 40 mg DR capsule    Heart failure (CMS/HCC)    Current Assessment & Plan     Doing well  No issues         Relevant Medications    amLODIPine (Norvasc) 10 mg tablet    Secondary DM with CKD stage 3 and hypertension " (CMS/HCC)    Current Assessment & Plan     Dm- rechecking today. Cont current med  HTN- a tgoal- cont meds  CKD- stable- rehcekcing labs today- gfr 40         Malignant neoplasm of upper-outer quadrant of right breast in female, estrogen receptor positive (CMS/HCC)    Current Assessment & Plan     Doing well  Continue hem/onc and breast team         Relevant Orders    Colonoscopy Screening; Average Risk Patient    Chronic bronchitis, unspecified chronic bronchitis type (CMS/[facility])  - stable  Continue to monitor     Other Visit Diagnoses       Encounter for Medicare annual wellness exam    -  Primary    Encounter for screening for other disorder        Class 1 obesity with serious comorbidity and body mass index (BMI) of 31.0 to 31.9 in adult, unspecified obesity type      Continue to work on healthy diet and exercise  We encourage all patients to engage in exercise 150 minutes per week   Adults 18 and older, activity during each week - if you are able:    Engage in at least 150 minutes of moderate or vigorous exercise per week   If you are able- Engage in muscle strengthening activity on 2 or more days per week                  Please follow up in  1 yr for medicare wellness and 6 months DM/HTN or as needed.

## 2024-01-22 RX ORDER — EZETIMIBE 10 MG/1
10 TABLET ORAL DAILY
Qty: 90 TABLET | Refills: 0 | Status: SHIPPED | OUTPATIENT
Start: 2024-01-22 | End: 2024-04-22 | Stop reason: SDUPTHER

## 2024-01-25 DIAGNOSIS — K22.70 BARRETT'S ESOPHAGUS WITHOUT DYSPLASIA: Primary | ICD-10-CM

## 2024-01-25 NOTE — TELEPHONE ENCOUNTER
Patient was last seen 1/12/24. Rx refill for   Metoclopramide 10 mg. 30 day supply.  [facility] [address] [place] [id number] [facility]     Phone: [phone]   Fax: [phone]

## 2024-01-26 ENCOUNTER — APPOINTMENT (OUTPATIENT)
Dept: RADIOLOGY | Facility: CLINIC | Age: 74
End: 2024-01-26
Payer: MEDICARE

## 2024-01-26 RX ORDER — METOCLOPRAMIDE 10 MG/1
10 TABLET ORAL 4 TIMES DAILY
COMMUNITY
End: 2024-01-26 | Stop reason: SDUPTHER

## 2024-02-01 RX ORDER — METOCLOPRAMIDE 10 MG/1
10 TABLET ORAL 4 TIMES DAILY
Qty: 30 TABLET | Refills: 1 | Status: SHIPPED | OUTPATIENT
Start: 2024-02-01 | End: 2024-04-10 | Stop reason: SDUPTHER

## 2024-02-01 NOTE — TELEPHONE ENCOUNTER
So sorry to hear she is not doing well  She was seen by Gi 2022 and they recommended follow up    Pls advise her to follow up with Dr. Farias (GI)  I believe they wanted  repeat EGD on her as well    In the mean time go back to use the esomeprazole BID and I refilled the Reglan

## 2024-02-02 ENCOUNTER — HOSPITAL ENCOUNTER (OUTPATIENT)
Dept: RADIATION ONCOLOGY | Facility: CLINIC | Age: 74
Setting detail: RADIATION/ONCOLOGY SERIES
Discharge: HOME | End: 2024-02-02
Payer: MEDICARE

## 2024-02-02 VITALS
DIASTOLIC BLOOD PRESSURE: 75 MMHG | TEMPERATURE: 98.4 F | BODY MASS INDEX: 31.35 KG/M2 | OXYGEN SATURATION: 96 % | SYSTOLIC BLOOD PRESSURE: 153 MMHG | WEIGHT: 168.21 LBS | RESPIRATION RATE: 18 BRPM | HEART RATE: 73 BPM

## 2024-02-02 DIAGNOSIS — C50.919 MALIGNANT NEOPLASM OF FEMALE BREAST, UNSPECIFIED ESTROGEN RECEPTOR STATUS, UNSPECIFIED LATERALITY, UNSPECIFIED SITE OF BREAST (MULTI): Primary | ICD-10-CM

## 2024-02-02 ASSESSMENT — PAIN SCALES - GENERAL: PAINLEVEL: 0-NO PAIN

## 2024-02-02 NOTE — PROGRESS NOTES
Radiation Oncology Follow-Up    Patient Name:  Amy Driver  MRN:  [id number]  :  1950    Referring Provider: No ref. provider found  Primary Care Provider: Reyna Ma DO  Care Team: Patient Care Team:  Reyna Ma DO as PCP - General (Family Medicine)  Nannette Albert MD as PCP - O Medicare Advantage PCP  Nannette Albert MD as Consulting Physician (Hematology and Oncology)    Date of Service: 2024   73-year-old female who underwent a routine screening mammogram on 2023, which revealed an enlarging spiculated mass in the upper outer quadrant of the right breast with associated pleomorphic calcifications.  Diagnostic views performed 2023 revealed a 3.3 x 2.3 x 1.8 cm irregular mass with associated calcifications.      She underwent a right breast ultrasound directed at the 10 o'clock position, 7 cm from the nipple which revealed a 2.8 x 1.6 x 3 cm heterogeneous mass which had been biopsied in the past and is consistent with a hamartoma.  At the 11 o'clock position, 4 cm from the nipple was a 1.9 x 2 x 1.7 cm hypoechoic mass.  Axillary ultrasound revealed 2 normal appearing lymph nodes.      On 2023 she underwent a right breast core biopsy directed at the 11 o'clock position, 4 cm from the nipple which revealed invasive ductal carcinoma, grade 2.  Estrogen and progesterone receptors stained positive at greater than 95%.  HER2/itz was 2+ on IHC and 1.2 on dual-velia.      On 2023 she underwent a right partial mastectomy.  Pathology revealed a 2 cm invasive ductal carcinoma, grade 3.  No angiolymphatic invasion was present.  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  There was no element of extensive intraductal component.  The resection margins were negative with tumor 0.12 cm from the lateral margin.       23 - 23: Radiation to the right breast consisting of a dose of 42.56 Gy with additional 10 Gy to the tumor  bed for a total of 52.56 Gy.     Adjuvant endocrine therapy with anastrozole.     SUBJECTIVE  History of Present Illness:   Amy Driver is here today for routine radiation follow up/initial radiation survivorship visit, She is doing well and reports right breast healing well.  She did have some desquamation after completion of radiation however this is resolving.  Using cocoa butter routinely.  No swelling of right arm or difficulty with ROM.  No headaches, fever, chills, cough, SOB, chest pain, N/V or bony pain.  She started anastrozole and no significant adverse effects.     Review of Systems:    Review of Systems   All other systems reviewed and are negative.    Performance Status:   The Karnofsky performance scale today is 100, Fully active, able to carry on all pre-disease performed without restriction (ECOG equivalent 0).      OBJECTIVE  Vital Signs:  /75   Pulse 73   Temp 36.9 °C (98.4 °F) (Core)   Resp 18   Wt 76.3 kg (168 lb 3.4 oz)   SpO2 96%   BMI 31.35 kg/m²      Current Outpatient Medications:     amLODIPine (Norvasc) 10 mg tablet, Take 1 tablet (10 mg) by mouth once daily., Disp: 90 tablet, Rfl: 3    anastrozole (Arimidex) 1 mg tablet, Take 1 tablet (1 mg total) by mouth once daily.  Swallow whole with a drink of water., Disp: 30 tablet, Rfl: 5    aspirin 81 mg EC tablet, Take 1 tablet (81 mg) by mouth once daily., Disp: , Rfl:     atorvastatin (Lipitor) 20 mg tablet, Take 1 tablet (20 mg) by mouth once daily., Disp: 90 tablet, Rfl: 3    carvedilol (Coreg) 25 mg tablet, Take 1 tablet (25 mg) by mouth 2 times a day with meals., Disp: 180 tablet, Rfl: 3    dapagliflozin propanediol (Farxiga) 5 mg, Take 1 tablet (5 mg) by mouth once daily., Disp: 90 tablet, Rfl: 3    dulaglutide (Trulicity) 3 mg/0.5 mL pen injector, INJECT 1 PEN UNDER THE SKIN ONE TIME WEEKLY ON THE SAME DAY EACH WEEK, Disp: 6 mL, Rfl: 1    esomeprazole (NexIUM) 40 mg DR capsule, Take 1 capsule (40 mg) by mouth once daily  in the morning. Take before meals., Disp: 90 capsule, Rfl: 3    ezetimibe (Zetia) 10 mg tablet, Take 1 tablet (10 mg) by mouth once daily., Disp: 90 tablet, Rfl: 0    furosemide (Lasix) 20 mg tablet, Take 1 tablet (20 mg) by mouth once daily., Disp: 90 tablet, Rfl: 3    lisinopril 40 mg tablet, Take 1 tablet (40 mg) by mouth once daily., Disp: , Rfl:     metFORMIN  mg 24 hr tablet, Take 1 tablet (500 mg) by mouth 2 times a day., Disp: 180 tablet, Rfl: 3    metoclopramide (Reglan) 10 mg tablet, Take 1 tablet (10 mg) by mouth 4 times a day., Disp: 30 tablet, Rfl: 1    multivitamin-Ca-iron-minerals (Tab-A-Valerio Womens) 27-0.4 mg tablet, Take 1 tablet by mouth once daily., Disp: , Rfl:     OneTouch Ultra Test strip, test 2 to 3 times daily as directed, Disp: , Rfl:     spironolactone (Aldactone) 25 mg tablet, Take 1 tablet (25 mg) by mouth once daily., Disp: 90 tablet, Rfl: 3    sucralfate (Carafate) 1 gram tablet, Take by mouth every 6 hours., Disp: , Rfl:     tretinoin (Retin-A) 0.025 % cream, 1 Application, Disp: , Rfl:     triamterene-hydrochlorothiazid (Maxzide) 75-50 mg tablet, Take 1 tablet by mouth once daily., Disp: , Rfl:    Physical Exam:  Physical Exam  Constitutional:       Appearance: Normal appearance.   HENT:      Head: Normocephalic and atraumatic.      Nose: Nose normal.      Mouth/Throat:      Mouth: Mucous membranes are moist.      Pharynx: Oropharynx is clear.   Eyes:      Conjunctiva/sclera: Conjunctivae normal.      Pupils: Pupils are equal, round, and reactive to light.   Cardiovascular:      Rate and Rhythm: Normal rate.      Heart sounds: Normal heart sounds.   Pulmonary:      Effort: Pulmonary effort is normal.      Breath sounds: Normal breath sounds.   Chest:   Breasts:     Right: Normal. No swelling, inverted nipple, mass or nipple discharge.      Left: Normal. No swelling, inverted nipple, mass, nipple discharge or skin change.      Comments: Right breast with well healed surgical  incision.  Resolving desquamation and tanning in radiation field.  Skin is intact.   Abdominal:      Palpations: Abdomen is soft.   Musculoskeletal:         General: No swelling. Normal range of motion.      Cervical back: Normal range of motion and neck supple.   Lymphadenopathy:      Cervical: No cervical adenopathy.      Upper Body:      Right upper body: No supraclavicular or axillary adenopathy.      Left upper body: No supraclavicular or axillary adenopathy.   Skin:     General: Skin is warm and dry.   Neurological:      General: No focal deficit present.      Mental Status: She is alert and oriented to person, place, and time.   Psychiatric:         Mood and Affect: Mood normal.         Behavior: Behavior normal.         ASSESSMENT/PLAN:  73 y.o. female with early stage right breast cancer s/p breast conserving surgery followed by radiation. Doing well.  Reviewed skin care, possible late effects of treatment, anastrozole side effects and follow up plan. She will follow up with Dr. Elizondo for mammogram.  Med onc follow up with Dr. Albert.  Radiation follow up in 6 mo.  Call with any questions or concerns.     Lyn Ramirez CNP  [phone]

## 2024-02-12 ENCOUNTER — OFFICE VISIT (OUTPATIENT)
Dept: SURGICAL ONCOLOGY | Facility: CLINIC | Age: 74
End: 2024-02-12
Payer: MEDICARE

## 2024-02-12 VITALS
SYSTOLIC BLOOD PRESSURE: 147 MMHG | WEIGHT: 164 LBS | HEART RATE: 88 BPM | BODY MASS INDEX: 30.57 KG/M2 | DIASTOLIC BLOOD PRESSURE: 81 MMHG

## 2024-02-12 DIAGNOSIS — C50.919 MALIGNANT NEOPLASM OF FEMALE BREAST, UNSPECIFIED ESTROGEN RECEPTOR STATUS, UNSPECIFIED LATERALITY, UNSPECIFIED SITE OF BREAST (MULTI): Primary | ICD-10-CM

## 2024-02-12 DIAGNOSIS — Z85.3 PERSONAL HISTORY OF BREAST CANCER: ICD-10-CM

## 2024-02-12 PROCEDURE — 3079F DIAST BP 80-89 MM HG: CPT | Performed by: SURGERY

## 2024-02-12 PROCEDURE — 3008F BODY MASS INDEX DOCD: CPT | Performed by: SURGERY

## 2024-02-12 PROCEDURE — 1036F TOBACCO NON-USER: CPT | Performed by: SURGERY

## 2024-02-12 PROCEDURE — 1160F RVW MEDS BY RX/DR IN RCRD: CPT | Performed by: SURGERY

## 2024-02-12 PROCEDURE — 3062F POS MACROALBUMINURIA REV: CPT | Performed by: SURGERY

## 2024-02-12 PROCEDURE — 99214 OFFICE O/P EST MOD 30 MIN: CPT | Performed by: SURGERY

## 2024-02-12 PROCEDURE — 4010F ACE/ARB THERAPY RXD/TAKEN: CPT | Performed by: SURGERY

## 2024-02-12 PROCEDURE — 1159F MED LIST DOCD IN RCRD: CPT | Performed by: SURGERY

## 2024-02-12 PROCEDURE — 3048F LDL-C <100 MG/DL: CPT | Performed by: SURGERY

## 2024-02-12 PROCEDURE — 3051F HG A1C>EQUAL 7.0%<8.0%: CPT | Performed by: SURGERY

## 2024-02-12 PROCEDURE — 1126F AMNT PAIN NOTED NONE PRSNT: CPT | Performed by: SURGERY

## 2024-02-12 PROCEDURE — 3077F SYST BP >= 140 MM HG: CPT | Performed by: SURGERY

## 2024-02-12 ASSESSMENT — PAIN SCALES - GENERAL: PAINLEVEL: 0-NO PAIN

## 2024-03-20 ENCOUNTER — TELEPHONE (OUTPATIENT)
Dept: PRIMARY CARE | Facility: CLINIC | Age: 74
End: 2024-03-20
Payer: MEDICARE

## 2024-03-20 NOTE — TELEPHONE ENCOUNTER
Patient was last seen 1/12/24. Upcoming appointment is 7/9/24.   Rx refill.  dulaglutide (Trulicity) 3 mg/0.5 mL pen injector   [facility]  - [place], [place] -   Phone: [phone]   Fax: [phone]   Out of medicine.

## 2024-03-25 DIAGNOSIS — I12.9 SECONDARY DM WITH CKD STAGE 3 AND HYPERTENSION (MULTI): ICD-10-CM

## 2024-03-25 DIAGNOSIS — N18.30 SECONDARY DM WITH CKD STAGE 3 AND HYPERTENSION (MULTI): ICD-10-CM

## 2024-03-25 DIAGNOSIS — E13.22 SECONDARY DM WITH CKD STAGE 3 AND HYPERTENSION (MULTI): ICD-10-CM

## 2024-03-25 RX ORDER — DULAGLUTIDE 3 MG/.5ML
INJECTION, SOLUTION SUBCUTANEOUS
Qty: 6 ML | Refills: 1 | Status: SHIPPED | OUTPATIENT
Start: 2024-03-25

## 2024-04-10 DIAGNOSIS — K22.70 BARRETT'S ESOPHAGUS WITHOUT DYSPLASIA: ICD-10-CM

## 2024-04-10 DIAGNOSIS — I50.9 HEART FAILURE, UNSPECIFIED HF CHRONICITY, UNSPECIFIED HEART FAILURE TYPE (MULTI): ICD-10-CM

## 2024-04-10 DIAGNOSIS — I10 ESSENTIAL HYPERTENSION WITH GOAL BLOOD PRESSURE LESS THAN 130/80: ICD-10-CM

## 2024-04-15 RX ORDER — FUROSEMIDE 20 MG/1
20 TABLET ORAL DAILY
Qty: 90 TABLET | Refills: 3 | Status: SHIPPED | OUTPATIENT
Start: 2024-04-15

## 2024-04-15 RX ORDER — METOCLOPRAMIDE 10 MG/1
10 TABLET ORAL 4 TIMES DAILY
Qty: 30 TABLET | Refills: 1 | Status: SHIPPED | OUTPATIENT
Start: 2024-04-15

## 2024-04-15 RX ORDER — AMLODIPINE BESYLATE 10 MG/1
10 TABLET ORAL DAILY
Qty: 90 TABLET | Refills: 3 | Status: SHIPPED | OUTPATIENT
Start: 2024-04-15

## 2024-04-19 ENCOUNTER — OFFICE VISIT (OUTPATIENT)
Dept: HEMATOLOGY/ONCOLOGY | Facility: CLINIC | Age: 74
End: 2024-04-19
Payer: MEDICARE

## 2024-04-19 VITALS
WEIGHT: 166.34 LBS | TEMPERATURE: 97.5 F | HEART RATE: 97 BPM | DIASTOLIC BLOOD PRESSURE: 75 MMHG | BODY MASS INDEX: 31 KG/M2 | SYSTOLIC BLOOD PRESSURE: 125 MMHG

## 2024-04-19 DIAGNOSIS — N63.0 BREAST MASS IN FEMALE: ICD-10-CM

## 2024-04-19 PROCEDURE — 3074F SYST BP LT 130 MM HG: CPT | Performed by: INTERNAL MEDICINE

## 2024-04-19 PROCEDURE — 99214 OFFICE O/P EST MOD 30 MIN: CPT | Performed by: INTERNAL MEDICINE

## 2024-04-19 PROCEDURE — 3062F POS MACROALBUMINURIA REV: CPT | Performed by: INTERNAL MEDICINE

## 2024-04-19 PROCEDURE — 1126F AMNT PAIN NOTED NONE PRSNT: CPT | Performed by: INTERNAL MEDICINE

## 2024-04-19 PROCEDURE — 3048F LDL-C <100 MG/DL: CPT | Performed by: INTERNAL MEDICINE

## 2024-04-19 PROCEDURE — 1159F MED LIST DOCD IN RCRD: CPT | Performed by: INTERNAL MEDICINE

## 2024-04-19 PROCEDURE — 4010F ACE/ARB THERAPY RXD/TAKEN: CPT | Performed by: INTERNAL MEDICINE

## 2024-04-19 PROCEDURE — 3051F HG A1C>EQUAL 7.0%<8.0%: CPT | Performed by: INTERNAL MEDICINE

## 2024-04-19 PROCEDURE — 3078F DIAST BP <80 MM HG: CPT | Performed by: INTERNAL MEDICINE

## 2024-04-19 PROCEDURE — 1160F RVW MEDS BY RX/DR IN RCRD: CPT | Performed by: INTERNAL MEDICINE

## 2024-04-19 ASSESSMENT — PAIN SCALES - GENERAL: PAINLEVEL: 0-NO PAIN

## 2024-04-19 NOTE — PROGRESS NOTES
Patient Visit Information:   Date of Service: 10/20/2023   Referring Provider:  Visit Type: Follow-up Visit      Cancer History:          Breast         AJCC Edition: 8th (AJCC), Diagnosis Date: 07/07/2023     Synopsis:       73 y.o. postmenopausal AA female with right-sided invasive ductal carcinoma, stage IA (T1c NX M0).  The patient's breast cancer was diagnosed on July 7, 2023, and is grade 2, estrogen receptor positive at >95%, progesterone receptor >95%, and HER-2/itz equivocal and not amplified via FISH.        ONCOLOGIC HISTORY:  Breast cancer (CMS/[facility]), Pathologic: Stage Unknown (pT1c, pNX, cM0, G3, ER+, RI+, HER2-, Oncotype DX score: 23)       TREATMENT HISTORY  05/01/2023: Patient underwent bilateral screening mammogram. This revealed a spiculated mas in the UOQ of the right breast associated with pleomorphic calcifications  05/02/2023: Patient underwent a right diagnostic mammogram and US. This confirmed a mass 3.3 x 2.3 x 1.8 cm with calcifications. US revealed a mass in the right breast at 10:00, 7 cm from the nipple measuring 2.8 x 1.6 x 3 cm which had been biopsied in the past and was consistent with a hamartoma. In addition, at 11:00, 4 cm from the nipple was a 1.9 x 2 x 1.7 cm mass. Right axillary US revealed 2 normal looking LN.  07/07/2023: Patient underwent an US-guided core needle biopsy of the mass at 11:00, 4 cm from the nipple. Pathology was consistent with invasive ductal carcinoma with results as above.   08/24/2023:  Patient underwent a right partial mastectomy without SLNB. Pathology was consistent with a 2 cm invasive carcinoma, grade 3, with clear margins  11/27/23 - 12/27/23: Radiation to the right breast consisting of a dose of 42.56 Gy with additional 10 Gy to the tumor bed for a total of 52.56 Gy.    01/2024 Started Adjuvant endocrine therapy with anastrozole.      History of Present Illness: Patient ID: Amy Driver is a 73 y.o. female.        Chief Complaint: Here for adjuvant  treatment of breast cancer.      Interval History:    Ms. Driver is a pleasant 73 y.o. postmenopausal AA female with right-sided invasive ductal carcinoma, stage IA (T1c NX M0).  The patient's breast cancer was diagnosed on July 7, 2023, and is grade 2, estrogen receptor positive at >95%, progesterone receptor >95%, and HER-2/itz equivocal and not amplified via FISH. Oncotype DX score: 23).      Here for a follow-up visit. In the interim since her last visit she has completed radiation therapy and started anastrozole. She reports hot flashes that have since resolved.     Her last bilateral mammogram was completed on 05/01/2023 and led to the current diagnosis.  Her last DEXA was completed on 05/01/2023 and showed osteopenia.       Review of Systems:      Review of Systems - Oncology  A 14-point review of system was completed and was negative except for what is noted in HPI.         Allergies and Intolerances:       Allergies: No Known Allergies              Outpatient Medication Profile:   Current Medications          Current Outpatient Medications   Medication Sig Dispense Refill    amLODIPine (Norvasc) 10 mg tablet Take 1 tablet (10 mg) by mouth once daily. 90 tablet 3    aspirin 81 mg EC tablet Take 1 tablet (81 mg) by mouth once daily.        atorvastatin (Lipitor) 20 mg tablet Take 1 tablet (20 mg) by mouth once daily. 90 tablet 3    carvedilol (Coreg) 25 mg tablet Take 1 tablet (25 mg) by mouth 2 times a day with meals. 180 tablet 3    dapagliflozin propanediol (Farxiga) 5 mg Take 1 tablet (5 mg) by mouth once daily. 90 tablet 3    dulaglutide (Trulicity) 3 mg/0.5 mL pen injector INJECT 1 PEN UNDER THE SKIN ONE TIME WEEKLY ON THE SAME DAY EACH WEEK 6 mL 1    esomeprazole (NexIUM) 40 mg DR capsule Take 1 capsule (40 mg) by mouth once daily in the morning. Take before meals. 90 capsule 0    furosemide (Lasix) 20 mg tablet Take 1 tablet (20 mg) by mouth once daily. 90 tablet 3    lisinopril 40 mg tablet Take 1  tablet (40 mg) by mouth once daily.        metFORMIN  mg 24 hr tablet Take 1 tablet (500 mg) by mouth 2 times a day. 180 tablet 3    multivitamin-Ca-iron-minerals (Tab-A-Valerio Womens) 27-0.4 mg tablet Take 1 tablet by mouth once daily.        OneTouch Ultra Test strip test 2 to 3 times daily as directed        spironolactone (Aldactone) 25 mg tablet Take 1 tablet (25 mg) by mouth once daily. 90 tablet 3    tretinoin (Retin-A) 0.025 % cream 1 Application          No current facility-administered medications for this visit.                  Medical History:    Medical History        Past Medical History:   Diagnosis Date    Chronic kidney disease, stage 3b (CMS/[facility]) 04/19/2022     Chronic kidney disease, stage 3b    Chronic kidney disease, stage 3b (CMS/[facility]) 04/19/2022     Chronic kidney disease, stage 3b    Diabetes mellitus (CMS/[facility])      Essential (primary) hypertension 01/03/2018     Essential hypertension with goal blood pressure less than 140/90    Other obesity due to excess calories 04/06/2021     Class 1 obesity due to excess calories with serious comorbidity and body mass index (BMI) of 30.0 to 30.9 in adult    Other specified abnormal findings of blood chemistry 07/29/2016     Elevated brain natriuretic peptide (BNP) level    Other specified cardiac arrhythmias 09/07/2016     Bigeminy    Personal history of other diseases of the circulatory system       History of hypertension    Personal history of other diseases of the digestive system       History of Mills's esophagus    Personal history of other endocrine, nutritional and metabolic disease       History of diabetes mellitus    Personal history of other endocrine, nutritional and metabolic disease 09/07/2016     History of diabetes mellitus    Personal history of other medical treatment 08/06/2016     History of screening mammography    Personal history of other specified conditions 08/31/2020     History of abnormal mammogram    Personal history  of other specified conditions 11/17/2019     History of nausea and vomiting    Personal history of pneumonia (recurrent) 08/06/2016     History of pneumonia    Shortness of breath 08/03/2016     Shortness of breath at rest         Surgical History:   Surgical History         Past Surgical History:   Procedure Laterality Date    BI MAMMO GUIDED LOCALIZATION BREAST RIGHT Right 8/21/2023     BI MAMMO GUIDED LOCALIZATION BREAST RIGHT 8/21/2023 [facility]    OTHER SURGICAL HISTORY   12/12/2019     Esophagogastroduodenoscopy                 Family History:  There is no family history of breast cancer.     Gynecologic History: Menarche age 12. G0.  Menopause age 44. She took oral contraceptive pills for 2 years.  She denies any history of hormone replacement therapy.     OBJECTIVE:     VS / Pain:  /72   Pulse 67   Temp 36.7 °C (98.1 °F)   Wt 76.2 kg (167 lb 15.9 oz)   SpO2 99%   BMI 31.31 kg/m²   BSA: 1.82 meters squared   Pain Scale: 0     The ECOG performance scale today is Asymptomatic     Physical Exam  Physical Exam:      Constitutional: Well developed, awake/alert/oriented  x3, no distress, alert and cooperative   Eyes: PERRL, EOMI, clear sclera   ENMT: mucous membranes moist, no apparent injury,  no lesions seen   Head/Neck: Neck supple, no apparent injury, thyroid  without mass or tenderness, No JVD, trachea midline, no bruits   Respiratory/Thorax: Patent airways, CTAB, normal  breath sounds with good chest expansion, thorax symmetric   Cardiovascular: Regular, rate and rhythm, no murmurs,  2+ equal pulses of the extremities, normal S 1and S 2   Gastrointestinal: Nondistended, soft, non-tender,  no rebound tenderness or guarding, no masses palpable, no organomegaly, +BS, no bruits   Musculoskeletal: ROM intact, no joint swelling, normal  strength   Extremities: Left upper extremity with hyperpigmentation  tracking along a vein starting in antecubital fossa   Neurological: alert and oriented x3, intact  senses,  motor, response and reflexes, normal strength   Breast: s/p rt sided partial mastectomy with well  healed surgical incision. No palpable mass in the left breast. No palpable axillary or supraclavicular lymphadenopathies bilaterally. No swelling of either upper extremity which had free range of motion.   Lymphatic: No significant lymphadenopathy   Psychological: Appropriate mood and behavior   Skin: Warm and dry, no lesions, no rashes       DIAGNOSTICRESULTSBEGIN@     RESULTS:           Lab Results   Component Value Date     WBC 6.7 01/11/2023     HGB 11.5 (L) 08/24/2023     HCT 34.6 (L) 08/24/2023     MCV 90 01/11/2023      01/11/2023        Chemistry           Lab Results   Component Value Date/Time      08/24/2023 0944     K 4.9 08/24/2023 0944      08/24/2023 0944     CO2 22 08/24/2023 0944     BUN 28 (H) 08/24/2023 0944     CREATININE 1.37 (H) 08/24/2023 0944          Lab Results   Component Value Date/Time     CALCIUM 10.2 08/24/2023 0944     ALKPHOS 71 04/06/2021 1241     AST 15 04/06/2022 1236     ALT 17 04/06/2022 1236     BILITOT 0.3 04/06/2021 1241         Study Result     Narrative & Impression   Interpreted By:  WENDY DEGROOT MD  MRN: [id number]  Patient Name: PADDY MORAN     STUDY:  DIGITAL DIAG MAMMO RIGHT UNILAT; BREAST ULTRASOUND;  6/1/2023 12:08  pm; 6/1/2023 12:22 pm     ACCESSION NUMBER(S):  [id number]; [id number]     ORDERING CLINICIAN:  ELIN MOLINA     INDICATION:  The patient was recalled from screening mammogram dated 05/01/2023  for an enlarging spiculated right breast mass with increasing  pleomorphic calcifications consistent with malignancy.     COMPARISON:  Mammograms dated 05/01/2023, 01/04/2022, 08/31/2020, 08/13/2020.  Ultrasounds dated 10/26/2020, 09/09/2020.     FINDINGS:  There are areas of scattered fibroglandular tissue. An irregular mass  is again seen in the upper outer quadrant at mid depth. There are  irregular pleomorphic calcifications  associated with the mass and  extending posteriorly. The entire area of mass and calcifications  spans approximately 3.3 x 2.3 x 1.8 cm.     Ultrasound: Targeted ultrasound with elastography of the upper outer  right breast was performed. At 10 o'clock 7 cm from the nipple, an  oval circumscribed heterogeneously echogenic mass is seen measuring  2.8 x 1.6 x 3.0 cm. There is no internal vascularity. It is soft on  elastography. This corresponds to the previously seen hamartoma,  previously annotated at 9 o'clock 9 cm from the nipple on the  ultrasound dated 10/26/2020. This likely corresponds to a  circumscribed mass in the upper outer breast at posterior depth. At  11 o'clock 4 cm from the nipple, an irregular hypoechoic mass with  indistinct and angular margins is seen measuring 1.9 x 2.0 x 1.7 cm.  It demonstrates internal vascularity. It is hard on elastography.     Targeted ultrasound of the right axilla was also performed. 2  morphologically normal lymph nodes are seen.     IMPRESSION:  Suspicious mass with associated suspicious calcifications in the  right breast. Surgical consultation and ultrasound guided core needle  biopsy of the mass are recommended. The calcifications are associated  with the mass and extend posteriorly, with the entire area spanning  up to 3.3 cm.     Unremarkable sonographic evaluation the right axilla.     Redemonstration of the previously seen hamartoma at 10 o'clock.     Dr. Ladd discussed the findings and recommendations with the  patient at the time of this dictation. A pre-procedure form was  completed. A message was sent to the referring practioner at the time  of this dictation regarding these critical findings using the Visi[employer]  system.     BI-RADS CATEGORY:     Category: 4 - Suspicious.  Recommendation: Biopsy Recommended.         Assessment and Plan:   Assessment  Ms. Driver is a pleasant 73 y.o. postmenopausal AA female with right-sided invasive ductal carcinoma,  stage IA (T1c NX M0).  The patient's breast cancer was diagnosed on July 7, 2023, and is grade 2, estrogen receptor positive at >95%, progesterone receptor >95%, and HER-2/itz equivocal and not amplified via FISH. Oncotype DX score: 23).      Patient is s/p primary breast surgery. Here for a follow-up visit. In the interim since her last visit she has completed radiation therapy and started anastrozole. She reports hot flashes that have since resolved.      Plan     Continue Anastrozole 1mg po daily  Take Vitamin D and Calcium supplements  Consider Adjuvant Zometa. Patient declined  Engage in at least 150min/s of Aerobic exercise weekly which translates to 30 minutes of brisk walking daily  RTC 6 months in [facility]

## 2024-04-22 DIAGNOSIS — I70.0 AORTIC ATHEROSCLEROSIS (CMS-HCC): ICD-10-CM

## 2024-04-22 DIAGNOSIS — E78.5 HYPERLIPIDEMIA, UNSPECIFIED HYPERLIPIDEMIA TYPE: ICD-10-CM

## 2024-04-22 DIAGNOSIS — I50.9 HEART FAILURE, UNSPECIFIED HF CHRONICITY, UNSPECIFIED HEART FAILURE TYPE (MULTI): ICD-10-CM

## 2024-04-22 RX ORDER — EZETIMIBE 10 MG/1
10 TABLET ORAL DAILY
Qty: 90 TABLET | Refills: 3 | Status: SHIPPED | OUTPATIENT
Start: 2024-04-22 | End: 2025-04-17

## 2024-04-22 NOTE — TELEPHONE ENCOUNTER
Last seen 1/12/24. Next appointment is 7/9/24.  Rx refill. 2 pills left.   ezetimibe (Zetia) 10 mg tablet   [id number] [place] [address]  Phone: [phone]   Fax: [phone]

## 2024-05-01 ENCOUNTER — HOSPITAL ENCOUNTER (OUTPATIENT)
Dept: RADIOLOGY | Facility: CLINIC | Age: 74
Discharge: HOME | End: 2024-05-01
Payer: MEDICARE

## 2024-05-01 VITALS — HEIGHT: 61 IN | BODY MASS INDEX: 31.38 KG/M2 | WEIGHT: 166.23 LBS

## 2024-05-01 DIAGNOSIS — Z85.3 PERSONAL HISTORY OF BREAST CANCER: ICD-10-CM

## 2024-05-01 PROCEDURE — 77062 BREAST TOMOSYNTHESIS BI: CPT

## 2024-05-01 PROCEDURE — 77066 DX MAMMO INCL CAD BI: CPT | Performed by: RADIOLOGY

## 2024-05-01 PROCEDURE — G0279 TOMOSYNTHESIS, MAMMO: HCPCS | Performed by: RADIOLOGY

## 2024-05-02 DIAGNOSIS — I10 ESSENTIAL HYPERTENSION WITH GOAL BLOOD PRESSURE LESS THAN 130/80: ICD-10-CM

## 2024-05-06 RX ORDER — ATORVASTATIN CALCIUM 20 MG/1
20 TABLET, FILM COATED ORAL DAILY
Qty: 90 TABLET | Refills: 1 | Status: SHIPPED | OUTPATIENT
Start: 2024-05-06

## 2024-06-18 ENCOUNTER — TELEPHONE (OUTPATIENT)
Dept: PRIMARY CARE | Facility: CLINIC | Age: 74
End: 2024-06-18

## 2024-06-18 DIAGNOSIS — N18.30 SECONDARY DM WITH CKD STAGE 3 AND HYPERTENSION (MULTI): ICD-10-CM

## 2024-06-18 DIAGNOSIS — I12.9 SECONDARY DM WITH CKD STAGE 3 AND HYPERTENSION (MULTI): ICD-10-CM

## 2024-06-18 DIAGNOSIS — E13.22 SECONDARY DM WITH CKD STAGE 3 AND HYPERTENSION (MULTI): ICD-10-CM

## 2024-06-18 NOTE — TELEPHONE ENCOUNTER
Patient states she has called around and no one has her trulicity. She would like to know what she should do. Patient states she is out of medication.

## 2024-06-19 ENCOUNTER — APPOINTMENT (OUTPATIENT)
Dept: SURGICAL ONCOLOGY | Facility: CLINIC | Age: 74
End: 2024-06-19
Payer: MEDICARE

## 2024-06-19 PROCEDURE — RXMED WILLOW AMBULATORY MEDICATION CHARGE

## 2024-06-19 RX ORDER — DULAGLUTIDE 3 MG/.5ML
INJECTION, SOLUTION SUBCUTANEOUS
Qty: 6 ML | Refills: 0 | Status: SHIPPED | OUTPATIENT
Start: 2024-06-19 | End: 2024-07-09 | Stop reason: DRUGHIGH

## 2024-06-19 NOTE — TELEPHONE ENCOUNTER
Britney has all strenghts with mail order  I will send to britney- have her call for mail order- should come in 24-48hrs

## 2024-06-22 ENCOUNTER — PHARMACY VISIT (OUTPATIENT)
Dept: PHARMACY | Facility: CLINIC | Age: 74
End: 2024-06-22
Payer: COMMERCIAL

## 2024-06-26 DIAGNOSIS — Z85.3 PERSONAL HISTORY OF MALIGNANT NEOPLASM OF BREAST: ICD-10-CM

## 2024-06-28 NOTE — PROGRESS NOTES
Chief Complaint      FU  Right breast cancer     History of Present Illness  Referred by a [facility]  PCP Reyna zee      74-year-old -American female here for FU  Right breast cancer     History:  1) No abnormal mammograms, breast biopsies or breast surgeries  2) May 1, 2023. Bilateral screening mammogram with tomosynthesis. Breast tissue is heterogeneously dense. Left breast negative. Right breast mass with calcifications, BI-RADS 0.  3) June 1, 2023. Left breast diagnostic imaging. Scattered fibroglandular tissue. Left breast irregular mass persists in the upper outer quadrant. There are associated irregular pleomorphic calcifications. The entire area of mass and calcifications spans approximately 3.3 x 2.3 x 1.8 cm. Targeted right breast ultrasound was performed. At 10:00 7 cm from the nipple a 2.8 x 1.6 x 3.0 cm is noted that corresponds to the previously seen hamartoma noted at 9:00 9 cm from the nipple. At 11:00 4 cm from the nipple a 1.9 x 2.0 x 1.7 cm irregular mass is noted, BI-RADS 4. Right axillary ultrasound is negative.  4) July 7, 2023 right breast mass 11:00 4 cm from the nipple biopsy. Grade 2 invasive ductal carcinoma with high-grade DCIS. ER greater than 95%, NM greater than 95%, HER2 2+ FISH not amplified. Open coil HydroMARK in position.  5) August 24, 2023. Right breast bracketed partial mastectomy. 2.0 cm invasive ductal carcinoma with associated DCIS. Final margins negative. New lateral margin is focally close. pT1c pNx  6) completed XRT mccullough  7) arimidex. Bety   8) 5/1/2024.  Bilateral mammogram.  BI-RADS 2        She presents today for FU  No breast concerns.   Recent bilateral mammogram     Ob/gyn history  menarche 12  menopause 42  G0  no OCPs, no fertility treatments, no HRT     No family history of breast or ovarian cancer.        Review of Systems  A comprehensive ROS was taken on the patient intake form and reviewed with the patient. This form is scanned into the electronic  medical record.     Constitutional symptoms: Denies generalized fatigue. Denies weight change, fevers/chills, difficulty sleeping   Eyes: Denies double vision, glaucoma, cataracts.  Ear/nose/throat/mouth: Denies hearing changes, sore throat, sinus problems.  Cardiovascular: No chest pain. Denies irregular heartbeat. Denies ankle swelling.  Respiratory: No wheezing, cough, or shortness of breath.  Gastrointestinal: No abdominal pain, No nausea/vomiting. No indigestion/heartburn. No change in bowel habits. No constipation or diarrhea.   Genitourinary: No urinary incontinence. No urinary frequency. No painful urination.  Musculoskeletal: No bone pain, no muscle pain, no joint pain.   Integumentary: No rash. No masses. No changes in moles. No easy bruising.  Neurological: No headaches. No tremors. No numbness/tingling.  Psychiatric: No anxiety. No depression.  Endocrine: No excessive thirst. Not too hot or too cold. Not tired or fatigued.   Hematological/lymphatic: No swollen glands or blood clotting problems. No bruising.     Physical Exam  A chaperone was offered for all portions of the physical exam. The patient declined.      General appearance: appears stated age, alert and oriented x 3  Head: Normocephalic, atraumatic  Eyes: conjunctivae/corneas clear.  Ears: External ears are normal, hearing is grossly intact.  Lungs: normal breathing  Heart: regular   Abdomen: Soft, nontender, nondistended.  Neurologic: grossly normal  Lymph nodes: No cervical, supraclavicular or axillary lymphadenopathy bilaterally     Breast: A comprehensive breast exam was performed in the seated and supine positions. Breasts are symmetrical. Bilateral nipples are everted. There are no skin changes on arm maneuvers. Bra size: C            Right: healed incision. Radiation changes. No masses.             Left: no masses              Results/Data  Imaging     reviewed by me  see HPI     Pathology  July 7, 2023 right breast mass 11:00 4 cm from  the nipple biopsy. Grade 2 invasive ductal carcinoma with high-grade DCIS. ER greater than 95%, SC greater than 95%, HER2 2+ FISH not amplified. Open coil HydroMARK in position.     Provider Impressions     1) 74-year-old -American female here with new right breast cancer, cT2 cN0 grade 2 IDC, ER/SC > 95% Her2 non amp. now s/p right bracket PM, pT1c pNx margins neg. completed radiation.  Arimidex. BOYD  2) comorbidities include diabetes, hypertension. Non-smoker.  3) no family history of breast or ovarian cancer.     Patient Discussion/Summary     She is here alone.  She completed radiation.  She is tolerating Arimidex.  Clinical exam is normal.  Recent bilateral mammogram is negative for suspicious findings.  She is happy to hear this.  We discussed follow-up.  Bilateral mammogram is due in May 2025.  She can follow-up with the surgical nurse practitioner after imaging.  She should call with any sooner concerns.

## 2024-07-01 ENCOUNTER — OFFICE VISIT (OUTPATIENT)
Dept: SURGICAL ONCOLOGY | Facility: CLINIC | Age: 74
End: 2024-07-01
Payer: MEDICARE

## 2024-07-01 VITALS
SYSTOLIC BLOOD PRESSURE: 155 MMHG | BODY MASS INDEX: 30.38 KG/M2 | DIASTOLIC BLOOD PRESSURE: 71 MMHG | WEIGHT: 163 LBS | HEART RATE: 75 BPM

## 2024-07-01 DIAGNOSIS — C50.919 MALIGNANT NEOPLASM OF FEMALE BREAST, UNSPECIFIED ESTROGEN RECEPTOR STATUS, UNSPECIFIED LATERALITY, UNSPECIFIED SITE OF BREAST (MULTI): Primary | ICD-10-CM

## 2024-07-01 DIAGNOSIS — N64.89 OTHER SPECIFIED DISORDERS OF BREAST: ICD-10-CM

## 2024-07-01 PROCEDURE — 4010F ACE/ARB THERAPY RXD/TAKEN: CPT | Performed by: SURGERY

## 2024-07-01 PROCEDURE — 3078F DIAST BP <80 MM HG: CPT | Performed by: SURGERY

## 2024-07-01 PROCEDURE — 1159F MED LIST DOCD IN RCRD: CPT | Performed by: SURGERY

## 2024-07-01 PROCEDURE — 3077F SYST BP >= 140 MM HG: CPT | Performed by: SURGERY

## 2024-07-01 PROCEDURE — 3051F HG A1C>EQUAL 7.0%<8.0%: CPT | Performed by: SURGERY

## 2024-07-01 PROCEDURE — 99214 OFFICE O/P EST MOD 30 MIN: CPT | Performed by: SURGERY

## 2024-07-01 PROCEDURE — 1126F AMNT PAIN NOTED NONE PRSNT: CPT | Performed by: SURGERY

## 2024-07-01 PROCEDURE — 3048F LDL-C <100 MG/DL: CPT | Performed by: SURGERY

## 2024-07-01 PROCEDURE — 1160F RVW MEDS BY RX/DR IN RCRD: CPT | Performed by: SURGERY

## 2024-07-01 PROCEDURE — 3062F POS MACROALBUMINURIA REV: CPT | Performed by: SURGERY

## 2024-07-01 ASSESSMENT — PAIN SCALES - GENERAL: PAINLEVEL: 0-NO PAIN

## 2024-07-01 NOTE — PATIENT INSTRUCTIONS
Order placed for bilateral mamogram  Please see a Surgical NP in May 2025 with the bilateral mammogram on the same day

## 2024-07-09 ENCOUNTER — APPOINTMENT (OUTPATIENT)
Dept: PRIMARY CARE | Facility: CLINIC | Age: 74
End: 2024-07-09
Payer: MEDICARE

## 2024-07-09 ENCOUNTER — LAB (OUTPATIENT)
Dept: LAB | Facility: LAB | Age: 74
End: 2024-07-09
Payer: MEDICARE

## 2024-07-09 VITALS
OXYGEN SATURATION: 98 % | SYSTOLIC BLOOD PRESSURE: 122 MMHG | WEIGHT: 162.3 LBS | TEMPERATURE: 97.6 F | BODY MASS INDEX: 30.64 KG/M2 | HEART RATE: 85 BPM | DIASTOLIC BLOOD PRESSURE: 72 MMHG | HEIGHT: 61 IN | RESPIRATION RATE: 15 BRPM

## 2024-07-09 DIAGNOSIS — Z11.59 ENCOUNTER FOR HEPATITIS C SCREENING TEST FOR LOW RISK PATIENT: ICD-10-CM

## 2024-07-09 DIAGNOSIS — N18.30 SECONDARY DM WITH CKD STAGE 3 AND HYPERTENSION (MULTI): ICD-10-CM

## 2024-07-09 DIAGNOSIS — K22.70 BARRETT'S ESOPHAGUS WITHOUT DYSPLASIA: ICD-10-CM

## 2024-07-09 DIAGNOSIS — D64.9 ANEMIA, UNSPECIFIED TYPE: Primary | ICD-10-CM

## 2024-07-09 DIAGNOSIS — I12.9 SECONDARY DM WITH CKD STAGE 3 AND HYPERTENSION (MULTI): ICD-10-CM

## 2024-07-09 DIAGNOSIS — E13.22 SECONDARY DM WITH CKD STAGE 3 AND HYPERTENSION (MULTI): ICD-10-CM

## 2024-07-09 DIAGNOSIS — C50.919 MALIGNANT NEOPLASM OF FEMALE BREAST, UNSPECIFIED ESTROGEN RECEPTOR STATUS, UNSPECIFIED LATERALITY, UNSPECIFIED SITE OF BREAST (MULTI): Primary | ICD-10-CM

## 2024-07-09 DIAGNOSIS — D64.9 ANEMIA, UNSPECIFIED TYPE: ICD-10-CM

## 2024-07-09 PROBLEM — H61.21 IMPACTED CERUMEN OF RIGHT EAR: Status: RESOLVED | Noted: 2023-03-21 | Resolved: 2024-07-09

## 2024-07-09 LAB
ANION GAP SERPL CALC-SCNC: 14 MMOL/L (ref 10–20)
BASOPHILS # BLD AUTO: 0.03 X10*3/UL (ref 0–0.1)
BASOPHILS NFR BLD AUTO: 0.4 %
BUN SERPL-MCNC: 19 MG/DL (ref 6–23)
CALCIUM SERPL-MCNC: 10.4 MG/DL (ref 8.6–10.6)
CHLORIDE SERPL-SCNC: 101 MMOL/L (ref 98–107)
CO2 SERPL-SCNC: 27 MMOL/L (ref 21–32)
CREAT SERPL-MCNC: 1.25 MG/DL (ref 0.5–1.05)
CREAT UR-MCNC: 35.2 MG/DL (ref 20–320)
EGFRCR SERPLBLD CKD-EPI 2021: 45 ML/MIN/1.73M*2
EOSINOPHIL # BLD AUTO: 0.16 X10*3/UL (ref 0–0.4)
EOSINOPHIL NFR BLD AUTO: 2.1 %
ERYTHROCYTE [DISTWIDTH] IN BLOOD BY AUTOMATED COUNT: 13 % (ref 11.5–14.5)
EST. AVERAGE GLUCOSE BLD GHB EST-MCNC: 171 MG/DL
GLUCOSE SERPL-MCNC: 191 MG/DL (ref 74–99)
HBA1C MFR BLD: 7.6 %
HCT VFR BLD AUTO: 37.9 % (ref 36–46)
HGB BLD-MCNC: 12.8 G/DL (ref 12–16)
IMM GRANULOCYTES # BLD AUTO: 0.03 X10*3/UL (ref 0–0.5)
IMM GRANULOCYTES NFR BLD AUTO: 0.4 % (ref 0–0.9)
IRON SATN MFR SERPL: 19 % (ref 25–45)
IRON SERPL-MCNC: 70 UG/DL (ref 35–150)
LYMPHOCYTES # BLD AUTO: 1.64 X10*3/UL (ref 0.8–3)
LYMPHOCYTES NFR BLD AUTO: 21.8 %
MCH RBC QN AUTO: 30.5 PG (ref 26–34)
MCHC RBC AUTO-ENTMCNC: 33.8 G/DL (ref 32–36)
MCV RBC AUTO: 91 FL (ref 80–100)
MICROALBUMIN UR-MCNC: <7 MG/L
MICROALBUMIN/CREAT UR: NORMAL MG/G{CREAT}
MONOCYTES # BLD AUTO: 0.7 X10*3/UL (ref 0.05–0.8)
MONOCYTES NFR BLD AUTO: 9.3 %
NEUTROPHILS # BLD AUTO: 4.95 X10*3/UL (ref 1.6–5.5)
NEUTROPHILS NFR BLD AUTO: 66 %
NRBC BLD-RTO: 0 /100 WBCS (ref 0–0)
PLATELET # BLD AUTO: 213 X10*3/UL (ref 150–450)
POTASSIUM SERPL-SCNC: 4.1 MMOL/L (ref 3.5–5.3)
RBC # BLD AUTO: 4.19 X10*6/UL (ref 4–5.2)
SODIUM SERPL-SCNC: 138 MMOL/L (ref 136–145)
TIBC SERPL-MCNC: 369 UG/DL (ref 240–445)
UIBC SERPL-MCNC: 299 UG/DL (ref 110–370)
WBC # BLD AUTO: 7.5 X10*3/UL (ref 4.4–11.3)

## 2024-07-09 PROCEDURE — 83036 HEMOGLOBIN GLYCOSYLATED A1C: CPT

## 2024-07-09 PROCEDURE — 85025 COMPLETE CBC W/AUTO DIFF WBC: CPT

## 2024-07-09 PROCEDURE — 36415 COLL VENOUS BLD VENIPUNCTURE: CPT

## 2024-07-09 PROCEDURE — 99213 OFFICE O/P EST LOW 20 MIN: CPT | Performed by: FAMILY MEDICINE

## 2024-07-09 PROCEDURE — 1036F TOBACCO NON-USER: CPT | Performed by: FAMILY MEDICINE

## 2024-07-09 PROCEDURE — 3078F DIAST BP <80 MM HG: CPT | Performed by: FAMILY MEDICINE

## 2024-07-09 PROCEDURE — 3074F SYST BP LT 130 MM HG: CPT | Performed by: FAMILY MEDICINE

## 2024-07-09 PROCEDURE — 83550 IRON BINDING TEST: CPT

## 2024-07-09 PROCEDURE — 82570 ASSAY OF URINE CREATININE: CPT

## 2024-07-09 PROCEDURE — 3048F LDL-C <100 MG/DL: CPT | Performed by: FAMILY MEDICINE

## 2024-07-09 PROCEDURE — 1159F MED LIST DOCD IN RCRD: CPT | Performed by: FAMILY MEDICINE

## 2024-07-09 PROCEDURE — 80048 BASIC METABOLIC PNL TOTAL CA: CPT

## 2024-07-09 PROCEDURE — 1160F RVW MEDS BY RX/DR IN RCRD: CPT | Performed by: FAMILY MEDICINE

## 2024-07-09 PROCEDURE — 4010F ACE/ARB THERAPY RXD/TAKEN: CPT | Performed by: FAMILY MEDICINE

## 2024-07-09 PROCEDURE — 3051F HG A1C>EQUAL 7.0%<8.0%: CPT | Performed by: FAMILY MEDICINE

## 2024-07-09 PROCEDURE — 3062F POS MACROALBUMINURIA REV: CPT | Performed by: FAMILY MEDICINE

## 2024-07-09 PROCEDURE — 82043 UR ALBUMIN QUANTITATIVE: CPT

## 2024-07-09 PROCEDURE — 86803 HEPATITIS C AB TEST: CPT

## 2024-07-09 PROCEDURE — 83540 ASSAY OF IRON: CPT

## 2024-07-09 RX ORDER — ANASTROZOLE 1 MG/1
TABLET ORAL
COMMUNITY
Start: 2024-06-06 | End: 2024-07-09 | Stop reason: SDUPTHER

## 2024-07-09 RX ORDER — ANASTROZOLE 1 MG/1
TABLET ORAL
Qty: 90 TABLET | Refills: 0 | Status: CANCELLED | OUTPATIENT
Start: 2024-07-09

## 2024-07-09 RX ORDER — METOCLOPRAMIDE 10 MG/1
10 TABLET ORAL 4 TIMES DAILY
Qty: 90 TABLET | Refills: 1 | Status: SHIPPED | OUTPATIENT
Start: 2024-07-09

## 2024-07-09 RX ORDER — DAPAGLIFLOZIN 10 MG/1
10 TABLET, FILM COATED ORAL DAILY
Qty: 90 TABLET | Refills: 1 | Status: SHIPPED | OUTPATIENT
Start: 2024-07-09

## 2024-07-09 RX ORDER — DULAGLUTIDE 3 MG/.5ML
INJECTION, SOLUTION SUBCUTANEOUS
Qty: 6 ML | Refills: 0 | Status: SHIPPED | OUTPATIENT
Start: 2024-07-09

## 2024-07-09 ASSESSMENT — PATIENT HEALTH QUESTIONNAIRE - PHQ9
2. FEELING DOWN, DEPRESSED OR HOPELESS: NOT AT ALL
SUM OF ALL RESPONSES TO PHQ9 QUESTIONS 1 AND 2: 0
1. LITTLE INTEREST OR PLEASURE IN DOING THINGS: NOT AT ALL

## 2024-07-09 NOTE — PATIENT INSTRUCTIONS
HTN  - Your blood pressure is at goal today- goal is less than 130/80  - Continue your current medications  - Weight loss can help lower your bp!  Work on a healthy whole food diet and add at least 30min of exercise 5 days per week  - Work on a low salt diet    DM  - inc farxiga  - Be on the look out for a call from the [facility] pharm       Please follow up as scheduled in JAN or as needed.       ** If labs or imaging ordered at today's visit, all the non-urgent results will be discussed at your next visit    If you have been referred for a special test or to a specialist please call  [phone] to schedule an appointment.  If you have any further questions, or if develop new or worsened symptoms, please give our office a call at [phone].

## 2024-07-09 NOTE — PROGRESS NOTES
"Subjective   Patient ID: Amy Driver is a 74 y.o. female who presents for Follow-up (Pt is here for HTN fuv.).    HPI   HTN  BP at goal today in office.  Using medications without issues.  Denies CP, SOB, palpitations, change in vision, dizziness, N/V.    Dm  Hba1c 7.2      Review of Systems  All systems reviewed and neg if not noted in the HPI above       Objective   /72 (Patient Position: Sitting)   Pulse 85   Temp 36.4 °C (97.6 °F)   Resp 15   Ht 1.56 m (5' 1.42\")   Wt 73.6 kg (162 lb 4.8 oz)   SpO2 98%   BMI 30.25 kg/m²     Physical Exam  Pleasant  Eyes: conjunctiva non-icteric and eye lids are without obvious rash or drooping. Pupils are symmetric.   Ears, Nose, Mouth, and Throat: External ears and nose appear to be without deformity or rash. No lesions or masses noted. Hearing is grossly intact.   CV: RRR, no murmur  Pulm:CTA B/L  LE: no edema  Psychiatric: Alert, orientation to person, place, and time. Recent/remote memory as evidenced through face-to-face interaction and discussion appear grossly intact. Mood and affect are normal.       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     "

## 2024-07-10 LAB — HCV AB SER QL: NONREACTIVE

## 2024-07-10 RX ORDER — ANASTROZOLE 1 MG/1
TABLET ORAL
Qty: 90 TABLET | Refills: 1 | Status: SHIPPED | OUTPATIENT
Start: 2024-07-10

## 2024-08-08 ENCOUNTER — APPOINTMENT (OUTPATIENT)
Dept: PHARMACY | Facility: HOSPITAL | Age: 74
End: 2024-08-08
Payer: MEDICARE

## 2024-08-08 DIAGNOSIS — I50.9 HEART FAILURE, UNSPECIFIED HF CHRONICITY, UNSPECIFIED HEART FAILURE TYPE (MULTI): Primary | ICD-10-CM

## 2024-08-08 DIAGNOSIS — E13.22 SECONDARY DM WITH CKD STAGE 3 AND HYPERTENSION (MULTI): ICD-10-CM

## 2024-08-08 DIAGNOSIS — N18.30 SECONDARY DM WITH CKD STAGE 3 AND HYPERTENSION (MULTI): ICD-10-CM

## 2024-08-08 DIAGNOSIS — I12.9 SECONDARY DM WITH CKD STAGE 3 AND HYPERTENSION (MULTI): ICD-10-CM

## 2024-08-09 NOTE — ASSESSMENT & PLAN NOTE
CHF ASSESSMENT     Symptom/Staging:  -Most recent ejection fraction: 55-60%    Guideline-Directed Medical Therapy:  -ARNI: No   -If no, then ACEi/ARB?: Yes, describe: Lisinopril  -Beta Blocker: Yes, describe: Carvedilol  -MRA: Yes, describe: Spironolactone  -SGLT2i: Yes, describe: Farxiga    Secondary Prevention:  -The ASCVD Risk score (Shiv AYOUB, et al., 2019) failed to calculate for the following reasons:    The valid total cholesterol range is 130 to 320 mg/dL   -Aspirin 81mg? yes  -Statin?: Yes, describe: Atorvastatin 20mg  -HTN?: Yes, describe: controlled      Recommend switching Lisinopril to Entresto- would be eligible for  PAP

## 2024-08-09 NOTE — PROGRESS NOTES
Clinical Pharmacy Appointment  Subjective     Patient ID: Amy Driver is a 74 y.o. female who presents for Diabetes, Chronic Kidney Disease, and Congestive Heart Failure.    Referring Provider: Reyna Ma DO     Diabetes  She presents for her initial diabetic visit. She has type 2 diabetes mellitus. Her disease course has been worsening. She is compliant with treatment all of the time.   Congestive Heart Failure  Presents for initial visit. The disease course has been stable. The symptoms have been stable. Compliance with prior treatments has been good.     CHRONIC KIDNEY DISEASE   Does patient see nephrology? No    Lab Results   Component Value Date    EGFR 45 (L) 07/09/2024     Stage: 3a (eGFR 45-59)  Albuminuria: A1 (<30 mg/g)  ACE/ARB: Yes, Lisinopril      Allergies   Allergen Reactions    Ciprofloxacin Unknown     rash       Objective     Current DM Pharmacotherapy:   Farxiga 10 mg - 1 tablet daily   Trulicity 3 mg/0.5 mL - once weekly   Metformin  mg - 1 tablet twice daily (pt only taking 1 daily)    Clarifications to above regimen: Recent increase in   Adverse Effects: None    HF Pharmacotherapy:  Carvedilol 25 mg - 1 tablet twice daily   Farxiga 10 mg - 1 tablet daily  Furosemide 20 mg - 1 tablet daily   Lisinopril 40 mg - 1 tablet daily   Spironolactone 25 mg - 1 tablet daily     SECONDARY PREVENTION  - Statin? Yes, Atorvastatin 20mg  - ACEi/ARB? Yes,    - Aspirin? Yes    Current monitoring regimen:   Patient is using: glucometer    Testing frequency: FBG    SMBG Readings: 112 mg/dL this morning    Any episodes of hypoglycemia? No.  Did patient treat episode of hypoglycemia appropriately? N/A    Pertinent PMH Review:  - PMH of Pancreatitis: No  - PMH/FH of Medullary Thyroid Cancer: No  - PMH/FH of Multiple Endocrine Neoplasia (MEN) Type II: No  - PMH of Retinopathy: No  - PMH of Urinary Tract Infections/Yeast Infections: No    Lab Review  BMP  Lab Results   Component Value Date    CALCIUM  10.4 07/09/2024     07/09/2024    K 4.1 07/09/2024    CO2 27 07/09/2024     07/09/2024    BUN 19 07/09/2024    CREATININE 1.25 (H) 07/09/2024    EGFR 45 (L) 07/09/2024     LFTs  Lab Results   Component Value Date    ALT 18 01/12/2024    AST 18 01/12/2024    ALKPHOS 62 01/12/2024    BILITOT 0.4 01/12/2024     FLP  Lab Results   Component Value Date    TRIG 257 (H) 01/12/2024    CHOL 117 01/12/2024    LDLF 33 04/06/2021    LDLCALC 32 01/12/2024    HDL 33.8 01/12/2024       The ASCVD Risk score (Shiv AYOUB, et al., 2019) failed to calculate for the following reasons:    The valid total cholesterol range is 130 to 320 mg/dL  Urine Microalbumin  Lab Results   Component Value Date    MICROALBCREA  07/09/2024      Comment:      One or more analytes used in this calculation is outside of the analytical measurement range. Calculation cannot be performed.     Weight Management  Wt Readings from Last 3 Encounters:   07/09/24 73.6 kg (162 lb 4.8 oz)   07/01/24 73.9 kg (163 lb)   05/01/24 75.4 kg (166 lb 3.6 oz)      There is no height or weight on file to calculate BMI.   A1C  Lab Results   Component Value Date    HGBA1C 7.6 (H) 07/09/2024    HGBA1C 7.2 (H) 01/12/2024    HGBA1C 7.7 (A) 01/11/2023         Assessment/Plan     Problem List Items Addressed This Visit       Heart failure (Multi) - Primary     CHF ASSESSMENT     Symptom/Staging:  -Most recent ejection fraction: 55-60%    Guideline-Directed Medical Therapy:  -ARNI: No   -If no, then ACEi/ARB?: Yes, describe: Lisinopril  -Beta Blocker: Yes, describe: Carvedilol  -MRA: Yes, describe: Spironolactone  -SGLT2i: Yes, describe: Farxiga    Secondary Prevention:  -The ASCVD Risk score (Shiv AYOUB, et al., 2019) failed to calculate for the following reasons:    The valid total cholesterol range is 130 to 320 mg/dL   -Aspirin 81mg? yes  -Statin?: Yes, describe: Atorvastatin 20mg  -HTN?: Yes, describe: controlled      Recommend switching Lisinopril to Entresto- would be  eligible for  PAP           Secondary DM with CKD stage 3 and hypertension (Multi)     CHRONIC KIDNEY DISEASE ASSESSMENT  Does patient see nephrology? No    Lab Results   Component Value Date    EGFR 45 (L) 2024     Stage: 3a (eGFR 45-59)  Albuminuria: A1 (<30 mg/g)  ACE/ARB: Yes, Lisinopril    Hypertension History:  HTN diagnosis: yes  Current Regimen  Amlodipine 10 mg  Carvedilol 25 mg - BID   Furosemide 20 mg   Spironolactone 25 mg   Triamterene-hydrochlorothiazide 75-50 mg  HTN at goal? yes    Hyperlipidemia History:  Diagnosis? yes  At goal? yes  Current LDL: 32  Current T    Medication Review  Current medications and doses were reviewed and are appropriate per current kidney function.    Continue current regimen- SGLT-2 I recently increased                 Patient Assistance Screening (VAF)    Patient verbally reports monthly or yearly income which is less than 400% federal poverty level    Application for program will be submitted for the following medications: Farxiga and Trulicity    Patient aware that they will need to provide appropriate proof of income documents: Social Security Benefit Statement and Statement of Non-filing - [employer] message sent to patient     Patient aware this process may take up to 2-4 weeks from time of submission and application will be submitted upon receipt of income documents     If approved, medication must be filled through [facility] pharmacy and may be picked up or mailed to patient. If approved, medication will be billed through insurance, and patient assistance team will pay the copay. This will result in a $0 copay for the patient.    Type 2 diabetes mellitus, is not at goal. Goal A1C: <7%    Follow up: I recommend diabetes care be 2 weeks.    Shanon Perales PharmD [facility]  Clinical Pharmacy Specialist, Primary Care     Continue all meds under the continuation of care with the referring provider and clinical pharmacy team

## 2024-08-09 NOTE — ASSESSMENT & PLAN NOTE
CHRONIC KIDNEY DISEASE ASSESSMENT  Does patient see nephrology? No    Lab Results   Component Value Date    EGFR 45 (L) 2024     Stage: 3a (eGFR 45-59)  Albuminuria: A1 (<30 mg/g)  ACE/ARB: Yes, Lisinopril    Hypertension History:  HTN diagnosis: yes  Current Regimen  Amlodipine 10 mg  Carvedilol 25 mg - BID   Furosemide 20 mg   Spironolactone 25 mg   Triamterene-hydrochlorothiazide 75-50 mg  HTN at goal? yes    Hyperlipidemia History:  Diagnosis? yes  At goal? yes  Current LDL: 32  Current T    Medication Review  Current medications and doses were reviewed and are appropriate per current kidney function.    Continue current regimen- SGLT-2 I recently increased

## 2024-08-21 ENCOUNTER — APPOINTMENT (OUTPATIENT)
Dept: PHARMACY | Facility: HOSPITAL | Age: 74
End: 2024-08-21
Payer: MEDICARE

## 2024-08-21 DIAGNOSIS — I12.9 SECONDARY DM WITH CKD STAGE 3 AND HYPERTENSION (MULTI): ICD-10-CM

## 2024-08-21 DIAGNOSIS — I50.9 HEART FAILURE, UNSPECIFIED HF CHRONICITY, UNSPECIFIED HEART FAILURE TYPE (MULTI): ICD-10-CM

## 2024-08-21 DIAGNOSIS — E13.22 SECONDARY DM WITH CKD STAGE 3 AND HYPERTENSION (MULTI): ICD-10-CM

## 2024-08-21 DIAGNOSIS — N18.30 SECONDARY DM WITH CKD STAGE 3 AND HYPERTENSION (MULTI): ICD-10-CM

## 2024-08-21 NOTE — PROGRESS NOTES
Clinical Pharmacy Appointment  Subjective     Patient ID: Amy Driver is a 74 y.o. female who presents for Diabetes and Congestive Heart Failure.    Referring Provider: Reyna Ma DO     Diabetes  She presents for her follow-up diabetic visit. She has type 2 diabetes mellitus. Her disease course has been worsening. She is compliant with treatment all of the time.   Congestive Heart Failure  Presents for follow-up visit. The disease course has been stable. The symptoms have been stable. Compliance with prior treatments has been good.     CHRONIC KIDNEY DISEASE   Does patient see nephrology? No    Lab Results   Component Value Date    EGFR 45 (L) 07/09/2024     Stage: 3a (eGFR 45-59)  Albuminuria: A1 (<30 mg/g)  ACE/ARB: Yes, Lisinopril      Allergies   Allergen Reactions    Ciprofloxacin Unknown     rash       Objective     Current DM Pharmacotherapy:   Farxiga 10 mg - 1 tablet daily   Trulicity 3 mg/0.5 mL - once weekly   Metformin  mg - 1 tablet twice daily (pt only taking 1 daily)    Clarifications to above regimen: None  Adverse Effects: None    HF Pharmacotherapy:  Carvedilol 25 mg - 1 tablet twice daily   Farxiga 10 mg - 1 tablet daily  Furosemide 20 mg - 1 tablet daily   Lisinopril 40 mg - 1 tablet daily   Spironolactone 25 mg - 1 tablet daily     SECONDARY PREVENTION  - Statin? Yes, Atorvastatin 20mg  - ACEi/ARB? Yes,    - Aspirin? Yes    Current monitoring regimen:   Patient is using: glucometer    Testing frequency: FBG    SMBG Readings: not discussed today    Any episodes of hypoglycemia? No.  Did patient treat episode of hypoglycemia appropriately? N/A    Pertinent PMH Review:  - PMH of Pancreatitis: No  - PMH/FH of Medullary Thyroid Cancer: No  - PMH/FH of Multiple Endocrine Neoplasia (MEN) Type II: No  - PMH of Retinopathy: No  - PMH of Urinary Tract Infections/Yeast Infections: No    Lab Review  BMP  Lab Results   Component Value Date    CALCIUM 10.4 07/09/2024     07/09/2024     K 4.1 07/09/2024    CO2 27 07/09/2024     07/09/2024    BUN 19 07/09/2024    CREATININE 1.25 (H) 07/09/2024    EGFR 45 (L) 07/09/2024     LFTs  Lab Results   Component Value Date    ALT 18 01/12/2024    AST 18 01/12/2024    ALKPHOS 62 01/12/2024    BILITOT 0.4 01/12/2024     FLP  Lab Results   Component Value Date    TRIG 257 (H) 01/12/2024    CHOL 117 01/12/2024    LDLF 33 04/06/2021    LDLCALC 32 01/12/2024    HDL 33.8 01/12/2024       The ASCVD Risk score (Shiv AYOUB, et al., 2019) failed to calculate for the following reasons:    The valid total cholesterol range is 130 to 320 mg/dL  Urine Microalbumin  Lab Results   Component Value Date    MICROALBCREA  07/09/2024      Comment:      One or more analytes used in this calculation is outside of the analytical measurement range. Calculation cannot be performed.     Weight Management  Wt Readings from Last 3 Encounters:   07/09/24 73.6 kg (162 lb 4.8 oz)   07/01/24 73.9 kg (163 lb)   05/01/24 75.4 kg (166 lb 3.6 oz)      There is no height or weight on file to calculate BMI.   A1C  Lab Results   Component Value Date    HGBA1C 7.6 (H) 07/09/2024    HGBA1C 7.2 (H) 01/12/2024    HGBA1C 7.7 (A) 01/11/2023         Assessment/Plan     Problem List Items Addressed This Visit       Heart failure (Multi)     CHF ASSESSMENT      Symptom/Staging:  -Most recent ejection fraction: 55-60%     Guideline-Directed Medical Therapy:  -ARNI: No              -If no, then ACEi/ARB?: Yes, describe: Lisinopril  -Beta Blocker: Yes, describe: Carvedilol  -MRA: Yes, describe: Spironolactone  -SGLT2i: Yes, describe: Farxiga     Secondary Prevention:  -The ASCVD Risk score (Shiv AOYUB, et al., 2019) failed to calculate for the following reasons:    The valid total cholesterol range is 130 to 320 mg/dL   -Aspirin 81mg? yes  -Statin?: Yes, describe: Atorvastatin 20mg  -HTN?: Yes, describe: controlled        Recommend switching Lisinopril to Entresto- would be eligible for  PAP          Secondary DM with CKD stage 3 and hypertension (Multi)     Continue current regimen due for new A1C in October, home numbers reportedly at goal.            Patient Assistance Screening (VAF)    Patient verbally reports monthly or yearly income which is less than 400% federal poverty level    Application for program will be submitted for the following medications: Farxiga and Trulicity    Patient aware that they will need to provide appropriate proof of income documents: Social Security Benefit Statement and Statement of Non-filing - [employer] message sent to patient - Patient states today that she will send it in and gives verbal permission to sign statement of non-filling     Patient aware this process may take up to 2-4 weeks from time of submission and application will be submitted upon receipt of income documents     If approved, medication must be filled through [facility] pharmacy and may be picked up or mailed to patient. If approved, medication will be billed through insurance, and patient assistance team will pay the copay. This will result in a $0 copay for the patient.    Type 2 diabetes mellitus, is not at goal. Goal A1C: <7%    Follow up: I recommend diabetes care be upon update of  VIJAYA Perales PharmD [facility]  Clinical Pharmacy Specialist, Primary Care     Continue all meds under the continuation of care with the referring provider and clinical pharmacy team

## 2024-08-30 ENCOUNTER — HOSPITAL ENCOUNTER (OUTPATIENT)
Dept: RADIATION ONCOLOGY | Facility: CLINIC | Age: 74
Setting detail: RADIATION/ONCOLOGY SERIES
Discharge: HOME | End: 2024-08-30
Payer: MEDICARE

## 2024-08-30 VITALS
HEART RATE: 71 BPM | DIASTOLIC BLOOD PRESSURE: 82 MMHG | BODY MASS INDEX: 30.32 KG/M2 | SYSTOLIC BLOOD PRESSURE: 152 MMHG | TEMPERATURE: 98.2 F | WEIGHT: 162.7 LBS | RESPIRATION RATE: 18 BRPM | OXYGEN SATURATION: 96 %

## 2024-08-30 DIAGNOSIS — C50.411 MALIGNANT NEOPLASM OF UPPER-OUTER QUADRANT OF RIGHT BREAST IN FEMALE, ESTROGEN RECEPTOR POSITIVE (MULTI): Primary | ICD-10-CM

## 2024-08-30 DIAGNOSIS — Z17.0 MALIGNANT NEOPLASM OF UPPER-OUTER QUADRANT OF RIGHT BREAST IN FEMALE, ESTROGEN RECEPTOR POSITIVE (MULTI): Primary | ICD-10-CM

## 2024-08-30 DIAGNOSIS — C50.919 MALIGNANT NEOPLASM OF FEMALE BREAST, UNSPECIFIED ESTROGEN RECEPTOR STATUS, UNSPECIFIED LATERALITY, UNSPECIFIED SITE OF BREAST (MULTI): ICD-10-CM

## 2024-08-30 DIAGNOSIS — Z79.811 AROMATASE INHIBITOR USE: ICD-10-CM

## 2024-08-30 PROCEDURE — 99213 OFFICE O/P EST LOW 20 MIN: CPT | Performed by: NURSE PRACTITIONER

## 2024-08-30 ASSESSMENT — PAIN SCALES - GENERAL: PAINLEVEL: 0-NO PAIN

## 2024-08-30 NOTE — PROGRESS NOTES
Radiation Oncology Follow-Up    Patient Name:  Amy Driver  MRN:  [id number]  :  1950    Referring Provider: Leah Ramirez, APR*  Primary Care Provider: Reyna Ma DO  Care Team: Patient Care Team:  Reyna Ma DO as PCP - General (Family Medicine)  Nannette Albert MD as Consulting Physician (Hematology and Oncology)    Date of Service: 2024   74-year-old female who underwent a routine screening mammogram on 2023, which revealed an enlarging spiculated mass in the upper outer quadrant of the right breast with associated pleomorphic calcifications.  Diagnostic views performed 2023 revealed a 3.3 x 2.3 x 1.8 cm irregular mass with associated calcifications.      She underwent a right breast ultrasound directed at the 10 o'clock position, 7 cm from the nipple which revealed a 2.8 x 1.6 x 3 cm heterogeneous mass which had been biopsied in the past and is consistent with a hamartoma.  At the 11 o'clock position, 4 cm from the nipple was a 1.9 x 2 x 1.7 cm hypoechoic mass.  Axillary ultrasound revealed 2 normal appearing lymph nodes.      On 2023 she underwent a right breast core biopsy directed at the 11 o'clock position, 4 cm from the nipple which revealed invasive ductal carcinoma, grade 2.  Estrogen and progesterone receptors stained positive at greater than 95%.  HER2/itz was 2+ on IHC and 1.2 on dual-velia.      On 2023 she underwent a right partial mastectomy.  Pathology revealed a 2 cm invasive ductal carcinoma, grade 3.  No angiolymphatic invasion was present.  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  There was no element of extensive intraductal component.  The resection margins were negative with tumor 0.12 cm from the lateral margin.       23 - 23: Radiation to the right breast consisting of a dose of 42.56 Gy with additional 10 Gy to the tumor bed for a total of 52.56 Gy.     Adjuvant endocrine  therapy with anastrozole.     SUBJECTIVE  History of Present Illness:   Amy Driver is here today for routine radiation follow up/surveillance visit, She is doing well and reports right breast well healed and skin is intact. Using cocoa butter routinely.  No swelling of right arm or difficulty with ROM.  No headaches, fever, chills, cough, SOB, chest pain, N/V or bony pain.  She continues anastrozole and no significant adverse effects.  Mammogram in May without evidence for malignancy.     Review of Systems:    Review of Systems   All other systems reviewed and are negative.    Performance Status:   The Karnofsky performance scale today is 100, Fully active, able to carry on all pre-disease performed without restriction (ECOG equivalent 0).      OBJECTIVE    Current Outpatient Medications:     amLODIPine (Norvasc) 10 mg tablet, Take 1 tablet (10 mg) by mouth once daily., Disp: 90 tablet, Rfl: 3    anastrozole (Arimidex) 1 mg tablet, Take one tablet daily, Disp: 90 tablet, Rfl: 1    aspirin 81 mg EC tablet, Take 1 tablet (81 mg) by mouth once daily., Disp: , Rfl:     atorvastatin (Lipitor) 20 mg tablet, TAKE ONE TABLET BY MOUTH EVERY DAY, Disp: 90 tablet, Rfl: 1    carvedilol (Coreg) 25 mg tablet, Take 1 tablet (25 mg) by mouth 2 times a day with meals., Disp: 180 tablet, Rfl: 3    dapagliflozin propanediol (Farxiga) 10 mg, Take 1 tablet (10 mg) by mouth once daily., Disp: 90 tablet, Rfl: 1    dulaglutide (Trulicity) 3 mg/0.5 mL pen injector, INJECT ONE PEN UNDER THE SKIN ONCE WEEKLY ON THE SAME DAY EACH WEEK- would like to increase dose next appt, Disp: 6 mL, Rfl: 0    esomeprazole (NexIUM) 40 mg DR capsule, Take 1 capsule (40 mg) by mouth once daily in the morning. Take before meals., Disp: 90 capsule, Rfl: 3    ezetimibe (Zetia) 10 mg tablet, Take 1 tablet (10 mg) by mouth once daily., Disp: 90 tablet, Rfl: 3    furosemide (Lasix) 20 mg tablet, Take 1 tablet (20 mg) by mouth once daily., Disp: 90 tablet, Rfl:  3    lisinopril 40 mg tablet, Take 1 tablet (40 mg) by mouth once daily., Disp: , Rfl:     metFORMIN  mg 24 hr tablet, Take 1 tablet (500 mg) by mouth 2 times a day. (Patient taking differently: Take 1 tablet (500 mg) by mouth once daily in the evening. Take with meals.), Disp: 180 tablet, Rfl: 3    metoclopramide (Reglan) 10 mg tablet, Take 1 tablet (10 mg) by mouth 4 times a day. As needed for nausea, Disp: 90 tablet, Rfl: 1    multivitamin-Ca-iron-minerals (Tab-A-Valerio Womens) 27-0.4 mg tablet, Take 1 tablet by mouth once daily., Disp: , Rfl:     OneTouch Ultra Test strip, test 2 to 3 times daily as directed, Disp: , Rfl:     spironolactone (Aldactone) 25 mg tablet, Take 1 tablet (25 mg) by mouth once daily., Disp: 90 tablet, Rfl: 3    sucralfate (Carafate) 1 gram tablet, Take by mouth every 6 hours., Disp: , Rfl:     tretinoin (Retin-A) 0.025 % cream, 1 Application, Disp: , Rfl:     triamterene-hydrochlorothiazid (Maxzide) 75-50 mg tablet, Take 1 tablet by mouth once daily., Disp: , Rfl:      Physical Exam  Constitutional:       Appearance: Normal appearance.   HENT:      Head: Normocephalic and atraumatic.      Nose: Nose normal.      Mouth/Throat:      Mouth: Mucous membranes are moist.      Pharynx: Oropharynx is clear.   Eyes:      Conjunctiva/sclera: Conjunctivae normal.      Pupils: Pupils are equal, round, and reactive to light.   Cardiovascular:      Rate and Rhythm: Normal rate.      Heart sounds: Normal heart sounds.   Pulmonary:      Effort: Pulmonary effort is normal.      Breath sounds: Normal breath sounds.   Chest:   Breasts:     Right: Normal. No swelling, inverted nipple, mass or nipple discharge.      Left: Normal. No swelling, inverted nipple, mass, nipple discharge or skin change.      Comments: Right breast with well healed surgical incision. Skin is intact.   Abdominal:      Palpations: Abdomen is soft.   Musculoskeletal:         General: No swelling. Normal range of motion.       Cervical back: Normal range of motion and neck supple.   Lymphadenopathy:      Cervical: No cervical adenopathy.      Upper Body:      Right upper body: No supraclavicular or axillary adenopathy.      Left upper body: No supraclavicular or axillary adenopathy.   Skin:     General: Skin is warm and dry.   Neurological:      General: No focal deficit present.      Mental Status: She is alert and oriented to person, place, and time.   Psychiatric:         Mood and Affect: Mood normal.         Behavior: Behavior normal.        Narrative & Impression   Interpreted By:  Vika Miranda,   STUDY:  BI MAMMO BILATERAL DIAGNOSTIC TOMOSYNTHESIS;  5/1/2024 12:10 pm      ACCESSION NUMBER(S):  [id number]      ORDERING CLINICIAN:  MAIN ELIZONDO      INDICATION:  History of right lumpectomy and radiation.      COMPARISON:  08/24/2023, 08/21/2023, 06/01/2023, 05/01/2023, and priors dating  back to 2020      FINDINGS:  And tomosynthesis images were reviewed at 1 mm slice thickness.      Density:  The breast tissue is heterogeneously dense, which may  obscure small masses.      Postlumpectomy changes including parenchymal scarring and surgical  clips are seen in the superolateral right breast at middle depth.  Diffuse right breast skin thickening is present, in keeping with  history of radiation treatment. There has been no change in the  parenchymal pattern. No suspicious masses or calcifications are  identified in either breast.      IMPRESSION:  No mammographic evidence of malignancy.      BI-RADS CATEGORY:      BI-RADS Category:  2 Benign.  Recommendation:  Annual Screening.  Recommended Date:  1 Year.  Laterality:  Bilateral.      ASSESSMENT/PLAN:  74 y.o. female with early stage right breast cancer s/p breast conserving surgery followed by radiation. Doing well.  She will follow up with Dr. Elizondo for mammogram.  Med onc follow up with Arin Montejo CNP.  Radiation follow up in 6 mo.  Call with any questions or concerns.     Lyn  James CNP  [phone]

## 2024-09-02 ENCOUNTER — PATIENT MESSAGE (OUTPATIENT)
Dept: PRIMARY CARE | Facility: CLINIC | Age: 74
End: 2024-09-02
Payer: MEDICARE

## 2024-09-02 DIAGNOSIS — I50.9 HEART FAILURE, UNSPECIFIED HF CHRONICITY, UNSPECIFIED HEART FAILURE TYPE (MULTI): ICD-10-CM

## 2024-09-02 DIAGNOSIS — I10 ESSENTIAL HYPERTENSION WITH GOAL BLOOD PRESSURE LESS THAN 130/80: ICD-10-CM

## 2024-09-04 ENCOUNTER — TELEPHONE (OUTPATIENT)
Dept: PRIMARY CARE | Facility: CLINIC | Age: 74
End: 2024-09-04
Payer: MEDICARE

## 2024-09-04 NOTE — TELEPHONE ENCOUNTER
Patient asking for a refill of     spironolactone (Aldactone) 25 mg tablet   To   [facility]  - [place], [place] - [address] [facility]  [address] [facility] [id number]

## 2024-09-05 RX ORDER — SPIRONOLACTONE 25 MG/1
25 TABLET ORAL DAILY
Qty: 90 TABLET | Refills: 3 | Status: SHIPPED | OUTPATIENT
Start: 2024-09-05

## 2024-09-11 ENCOUNTER — TELEPHONE (OUTPATIENT)
Dept: PRIMARY CARE | Facility: CLINIC | Age: 74
End: 2024-09-11
Payer: MEDICARE

## 2024-09-11 DIAGNOSIS — I12.9 SECONDARY DM WITH CKD STAGE 3 AND HYPERTENSION (MULTI): ICD-10-CM

## 2024-09-11 DIAGNOSIS — N18.30 SECONDARY DM WITH CKD STAGE 3 AND HYPERTENSION (MULTI): ICD-10-CM

## 2024-09-11 DIAGNOSIS — E13.22 SECONDARY DM WITH CKD STAGE 3 AND HYPERTENSION (MULTI): ICD-10-CM

## 2024-09-11 RX ORDER — DULAGLUTIDE 3 MG/.5ML
INJECTION, SOLUTION SUBCUTANEOUS
Qty: 6 ML | Refills: 1 | Status: SHIPPED | OUTPATIENT
Start: 2024-09-11

## 2024-09-11 NOTE — TELEPHONE ENCOUNTER
Patient called asking for a refill for   dulaglutide (Trulicity) 3 mg/0.5 mL pen injector   Asking for it to be sent to   [facility] [address] [place] [address], [place] - [address] [facility]

## 2024-10-25 ENCOUNTER — OFFICE VISIT (OUTPATIENT)
Dept: HEMATOLOGY/ONCOLOGY | Facility: CLINIC | Age: 74
End: 2024-10-25
Payer: MEDICARE

## 2024-10-25 VITALS
DIASTOLIC BLOOD PRESSURE: 79 MMHG | SYSTOLIC BLOOD PRESSURE: 165 MMHG | HEART RATE: 63 BPM | TEMPERATURE: 97.7 F | WEIGHT: 153.99 LBS | BODY MASS INDEX: 28.7 KG/M2

## 2024-10-25 DIAGNOSIS — Z92.3 HISTORY OF EXTERNAL BEAM RADIATION THERAPY: ICD-10-CM

## 2024-10-25 DIAGNOSIS — Z79.811 ENCOUNTER FOR MONITORING AROMATASE INHIBITOR THERAPY: Primary | ICD-10-CM

## 2024-10-25 DIAGNOSIS — Z98.890 HISTORY OF LUMPECTOMY OF RIGHT BREAST: ICD-10-CM

## 2024-10-25 DIAGNOSIS — Z51.81 ENCOUNTER FOR MONITORING AROMATASE INHIBITOR THERAPY: Primary | ICD-10-CM

## 2024-10-25 DIAGNOSIS — N63.0 BREAST MASS IN FEMALE: ICD-10-CM

## 2024-10-25 DIAGNOSIS — C50.919 MALIGNANT NEOPLASM OF FEMALE BREAST, UNSPECIFIED ESTROGEN RECEPTOR STATUS, UNSPECIFIED LATERALITY, UNSPECIFIED SITE OF BREAST: ICD-10-CM

## 2024-10-25 PROCEDURE — 3051F HG A1C>EQUAL 7.0%<8.0%: CPT | Performed by: NURSE PRACTITIONER

## 2024-10-25 PROCEDURE — 3062F POS MACROALBUMINURIA REV: CPT | Performed by: NURSE PRACTITIONER

## 2024-10-25 PROCEDURE — 3078F DIAST BP <80 MM HG: CPT | Performed by: NURSE PRACTITIONER

## 2024-10-25 PROCEDURE — 3048F LDL-C <100 MG/DL: CPT | Performed by: NURSE PRACTITIONER

## 2024-10-25 PROCEDURE — 4010F ACE/ARB THERAPY RXD/TAKEN: CPT | Performed by: NURSE PRACTITIONER

## 2024-10-25 PROCEDURE — 99214 OFFICE O/P EST MOD 30 MIN: CPT | Performed by: NURSE PRACTITIONER

## 2024-10-25 PROCEDURE — 1126F AMNT PAIN NOTED NONE PRSNT: CPT | Performed by: NURSE PRACTITIONER

## 2024-10-25 PROCEDURE — 3077F SYST BP >= 140 MM HG: CPT | Performed by: NURSE PRACTITIONER

## 2024-10-25 RX ORDER — ANASTROZOLE 1 MG/1
TABLET ORAL
Qty: 90 TABLET | Refills: 3 | Status: SHIPPED | OUTPATIENT
Start: 2024-10-25

## 2024-10-25 ASSESSMENT — PAIN SCALES - GENERAL: PAINLEVEL_OUTOF10: 0-NO PAIN

## 2024-10-29 DIAGNOSIS — I10 ESSENTIAL HYPERTENSION WITH GOAL BLOOD PRESSURE LESS THAN 130/80: ICD-10-CM

## 2024-10-30 PROBLEM — Z92.3 HISTORY OF EXTERNAL BEAM RADIATION THERAPY: Status: ACTIVE | Noted: 2024-10-30

## 2024-10-30 PROBLEM — Z51.81 ENCOUNTER FOR MONITORING AROMATASE INHIBITOR THERAPY: Status: ACTIVE | Noted: 2024-10-30

## 2024-10-30 PROBLEM — Z79.811 ENCOUNTER FOR MONITORING AROMATASE INHIBITOR THERAPY: Status: ACTIVE | Noted: 2024-10-30

## 2024-10-31 RX ORDER — ATORVASTATIN CALCIUM 20 MG/1
20 TABLET, FILM COATED ORAL DAILY
Qty: 90 TABLET | Refills: 1 | Status: SHIPPED | OUTPATIENT
Start: 2024-10-31

## 2024-12-10 ENCOUNTER — TELEPHONE (OUTPATIENT)
Dept: PRIMARY CARE | Facility: CLINIC | Age: 74
End: 2024-12-10
Payer: MEDICARE

## 2024-12-10 NOTE — TELEPHONE ENCOUNTER
Patient calling for get a refill of carvedilol (Coreg) 25 mg tablet   Please send to   [facility]  - [place], [place] - [address] [facility]  [address] [facility] [id number]

## 2025-01-06 DIAGNOSIS — I10 ESSENTIAL HYPERTENSION WITH GOAL BLOOD PRESSURE LESS THAN 130/80: ICD-10-CM

## 2025-01-06 DIAGNOSIS — I50.9 HEART FAILURE, UNSPECIFIED HF CHRONICITY, UNSPECIFIED HEART FAILURE TYPE: ICD-10-CM

## 2025-01-06 RX ORDER — CARVEDILOL 25 MG/1
25 TABLET ORAL
Qty: 180 TABLET | Refills: 3 | Status: SHIPPED | OUTPATIENT
Start: 2025-01-06

## 2025-01-09 DIAGNOSIS — Z12.11 ENCOUNTER FOR SCREENING FOR MALIGNANT NEOPLASM OF COLON: Primary | ICD-10-CM

## 2025-01-09 DIAGNOSIS — K21.9 GASTROESOPHAGEAL REFLUX DISEASE WITHOUT ESOPHAGITIS: ICD-10-CM

## 2025-01-09 DIAGNOSIS — K22.70 BARRETT'S ESOPHAGUS WITHOUT DYSPLASIA: ICD-10-CM

## 2025-01-13 ENCOUNTER — APPOINTMENT (OUTPATIENT)
Dept: PRIMARY CARE | Facility: CLINIC | Age: 75
End: 2025-01-13
Payer: MEDICARE

## 2025-01-13 ENCOUNTER — LAB (OUTPATIENT)
Dept: LAB | Facility: LAB | Age: 75
End: 2025-01-13
Payer: MEDICARE

## 2025-01-13 VITALS
RESPIRATION RATE: 16 BRPM | HEIGHT: 61 IN | DIASTOLIC BLOOD PRESSURE: 70 MMHG | WEIGHT: 161 LBS | OXYGEN SATURATION: 97 % | SYSTOLIC BLOOD PRESSURE: 122 MMHG | TEMPERATURE: 97 F | BODY MASS INDEX: 30.4 KG/M2 | HEART RATE: 87 BPM

## 2025-01-13 DIAGNOSIS — I50.9 HEART FAILURE, UNSPECIFIED HF CHRONICITY, UNSPECIFIED HEART FAILURE TYPE: ICD-10-CM

## 2025-01-13 DIAGNOSIS — K22.70 BARRETT'S ESOPHAGUS WITHOUT DYSPLASIA: ICD-10-CM

## 2025-01-13 DIAGNOSIS — I12.9 SECONDARY DM WITH CKD STAGE 3 AND HYPERTENSION (MULTI): ICD-10-CM

## 2025-01-13 DIAGNOSIS — E13.22 SECONDARY DM WITH CKD STAGE 3 AND HYPERTENSION (MULTI): ICD-10-CM

## 2025-01-13 DIAGNOSIS — D64.9 ANEMIA, UNSPECIFIED TYPE: ICD-10-CM

## 2025-01-13 DIAGNOSIS — C50.919 MALIGNANT NEOPLASM OF FEMALE BREAST, UNSPECIFIED ESTROGEN RECEPTOR STATUS, UNSPECIFIED LATERALITY, UNSPECIFIED SITE OF BREAST: ICD-10-CM

## 2025-01-13 DIAGNOSIS — J42 CHRONIC BRONCHITIS, UNSPECIFIED CHRONIC BRONCHITIS TYPE (MULTI): ICD-10-CM

## 2025-01-13 DIAGNOSIS — Z12.31 ENCOUNTER FOR SCREENING MAMMOGRAM FOR BREAST CANCER: ICD-10-CM

## 2025-01-13 DIAGNOSIS — Z78.0 ASYMPTOMATIC MENOPAUSE: ICD-10-CM

## 2025-01-13 DIAGNOSIS — N18.30 SECONDARY DM WITH CKD STAGE 3 AND HYPERTENSION (MULTI): ICD-10-CM

## 2025-01-13 DIAGNOSIS — K21.9 GASTROESOPHAGEAL REFLUX DISEASE WITHOUT ESOPHAGITIS: ICD-10-CM

## 2025-01-13 DIAGNOSIS — I10 ESSENTIAL HYPERTENSION WITH GOAL BLOOD PRESSURE LESS THAN 130/80: ICD-10-CM

## 2025-01-13 DIAGNOSIS — E66.811 CLASS 1 OBESITY WITH SERIOUS COMORBIDITY AND BODY MASS INDEX (BMI) OF 30.0 TO 30.9 IN ADULT, UNSPECIFIED OBESITY TYPE: ICD-10-CM

## 2025-01-13 DIAGNOSIS — N18.32 CHRONIC KIDNEY DISEASE, STAGE 3B (MULTI): ICD-10-CM

## 2025-01-13 DIAGNOSIS — Z13.89 ENCOUNTER FOR SCREENING FOR OTHER DISORDER: ICD-10-CM

## 2025-01-13 DIAGNOSIS — Z00.00 ENCOUNTER FOR MEDICARE ANNUAL WELLNESS EXAM: Primary | ICD-10-CM

## 2025-01-13 LAB
ALBUMIN SERPL BCP-MCNC: 4.5 G/DL (ref 3.4–5)
ALP SERPL-CCNC: 77 U/L (ref 33–136)
ALT SERPL W P-5'-P-CCNC: 13 U/L (ref 7–45)
ANION GAP SERPL CALC-SCNC: 17 MMOL/L (ref 10–20)
AST SERPL W P-5'-P-CCNC: 16 U/L (ref 9–39)
BASOPHILS # BLD AUTO: 0.04 X10*3/UL (ref 0–0.1)
BASOPHILS NFR BLD AUTO: 0.7 %
BILIRUB SERPL-MCNC: 0.4 MG/DL (ref 0–1.2)
BUN SERPL-MCNC: 20 MG/DL (ref 6–23)
CALCIUM SERPL-MCNC: 10.3 MG/DL (ref 8.6–10.6)
CHLORIDE SERPL-SCNC: 103 MMOL/L (ref 98–107)
CHOLEST SERPL-MCNC: 106 MG/DL (ref 0–199)
CHOLESTEROL/HDL RATIO: 2.7
CO2 SERPL-SCNC: 26 MMOL/L (ref 21–32)
CREAT SERPL-MCNC: 1.11 MG/DL (ref 0.5–1.05)
EGFRCR SERPLBLD CKD-EPI 2021: 52 ML/MIN/1.73M*2
EOSINOPHIL # BLD AUTO: 0.12 X10*3/UL (ref 0–0.4)
EOSINOPHIL NFR BLD AUTO: 2.2 %
ERYTHROCYTE [DISTWIDTH] IN BLOOD BY AUTOMATED COUNT: 13.2 % (ref 11.5–14.5)
EST. AVERAGE GLUCOSE BLD GHB EST-MCNC: 160 MG/DL
GLUCOSE SERPL-MCNC: 151 MG/DL (ref 74–99)
HBA1C MFR BLD: 7.2 %
HCT VFR BLD AUTO: 40.1 % (ref 36–46)
HDLC SERPL-MCNC: 39 MG/DL
HGB BLD-MCNC: 13.1 G/DL (ref 12–16)
IMM GRANULOCYTES # BLD AUTO: 0.01 X10*3/UL (ref 0–0.5)
IMM GRANULOCYTES NFR BLD AUTO: 0.2 % (ref 0–0.9)
IRON SATN MFR SERPL: 27 % (ref 25–45)
IRON SERPL-MCNC: 94 UG/DL (ref 35–150)
LDLC SERPL DIRECT ASSAY-MCNC: 45 MG/DL (ref 0–129)
LYMPHOCYTES # BLD AUTO: 1.81 X10*3/UL (ref 0.8–3)
LYMPHOCYTES NFR BLD AUTO: 33.2 %
MCH RBC QN AUTO: 30.3 PG (ref 26–34)
MCHC RBC AUTO-ENTMCNC: 32.7 G/DL (ref 32–36)
MCV RBC AUTO: 93 FL (ref 80–100)
MONOCYTES # BLD AUTO: 0.53 X10*3/UL (ref 0.05–0.8)
MONOCYTES NFR BLD AUTO: 9.7 %
NEUTROPHILS # BLD AUTO: 2.95 X10*3/UL (ref 1.6–5.5)
NEUTROPHILS NFR BLD AUTO: 54 %
NON-HDL CHOLESTEROL: 67 MG/DL (ref 0–149)
NRBC BLD-RTO: 0 /100 WBCS (ref 0–0)
PLATELET # BLD AUTO: 201 X10*3/UL (ref 150–450)
POTASSIUM SERPL-SCNC: 4.3 MMOL/L (ref 3.5–5.3)
PROT SERPL-MCNC: 8.1 G/DL (ref 6.4–8.2)
RBC # BLD AUTO: 4.33 X10*6/UL (ref 4–5.2)
SODIUM SERPL-SCNC: 142 MMOL/L (ref 136–145)
TIBC SERPL-MCNC: 348 UG/DL (ref 240–445)
TSH SERPL-ACNC: 1.6 MIU/L (ref 0.44–3.98)
UIBC SERPL-MCNC: 254 UG/DL (ref 110–370)
WBC # BLD AUTO: 5.5 X10*3/UL (ref 4.4–11.3)

## 2025-01-13 PROCEDURE — 1170F FXNL STATUS ASSESSED: CPT | Performed by: FAMILY MEDICINE

## 2025-01-13 PROCEDURE — 1160F RVW MEDS BY RX/DR IN RCRD: CPT | Performed by: FAMILY MEDICINE

## 2025-01-13 PROCEDURE — 99397 PER PM REEVAL EST PAT 65+ YR: CPT | Performed by: FAMILY MEDICINE

## 2025-01-13 PROCEDURE — 80053 COMPREHEN METABOLIC PANEL: CPT

## 2025-01-13 PROCEDURE — G0447 BEHAVIOR COUNSEL OBESITY 15M: HCPCS | Performed by: FAMILY MEDICINE

## 2025-01-13 PROCEDURE — 3078F DIAST BP <80 MM HG: CPT | Performed by: FAMILY MEDICINE

## 2025-01-13 PROCEDURE — 1123F ACP DISCUSS/DSCN MKR DOCD: CPT | Performed by: FAMILY MEDICINE

## 2025-01-13 PROCEDURE — 3074F SYST BP LT 130 MM HG: CPT | Performed by: FAMILY MEDICINE

## 2025-01-13 PROCEDURE — 83550 IRON BINDING TEST: CPT

## 2025-01-13 PROCEDURE — G0439 PPPS, SUBSEQ VISIT: HCPCS | Performed by: FAMILY MEDICINE

## 2025-01-13 PROCEDURE — 83718 ASSAY OF LIPOPROTEIN: CPT

## 2025-01-13 PROCEDURE — G0446 INTENS BEHAVE THER CARDIO DX: HCPCS | Performed by: FAMILY MEDICINE

## 2025-01-13 PROCEDURE — 82465 ASSAY BLD/SERUM CHOLESTEROL: CPT

## 2025-01-13 PROCEDURE — 1159F MED LIST DOCD IN RCRD: CPT | Performed by: FAMILY MEDICINE

## 2025-01-13 PROCEDURE — 1158F ADVNC CARE PLAN TLK DOCD: CPT | Performed by: FAMILY MEDICINE

## 2025-01-13 PROCEDURE — 4010F ACE/ARB THERAPY RXD/TAKEN: CPT | Performed by: FAMILY MEDICINE

## 2025-01-13 PROCEDURE — 83540 ASSAY OF IRON: CPT

## 2025-01-13 PROCEDURE — 84443 ASSAY THYROID STIM HORMONE: CPT

## 2025-01-13 PROCEDURE — 85025 COMPLETE CBC W/AUTO DIFF WBC: CPT

## 2025-01-13 PROCEDURE — 1036F TOBACCO NON-USER: CPT | Performed by: FAMILY MEDICINE

## 2025-01-13 PROCEDURE — 83721 ASSAY OF BLOOD LIPOPROTEIN: CPT

## 2025-01-13 PROCEDURE — 83036 HEMOGLOBIN GLYCOSYLATED A1C: CPT

## 2025-01-13 PROCEDURE — G0444 DEPRESSION SCREEN ANNUAL: HCPCS | Performed by: FAMILY MEDICINE

## 2025-01-13 RX ORDER — DAPAGLIFLOZIN 10 MG/1
10 TABLET, FILM COATED ORAL DAILY
Qty: 90 TABLET | Refills: 1 | Status: SHIPPED | OUTPATIENT
Start: 2025-01-13

## 2025-01-13 RX ORDER — ESOMEPRAZOLE MAGNESIUM 40 MG/1
CAPSULE, DELAYED RELEASE ORAL
Qty: 90 CAPSULE | Refills: 0 | Status: SHIPPED | OUTPATIENT
Start: 2025-01-13 | End: 2025-01-13 | Stop reason: SDUPTHER

## 2025-01-13 RX ORDER — ESOMEPRAZOLE MAGNESIUM 40 MG/1
40 CAPSULE, DELAYED RELEASE ORAL
Qty: 90 CAPSULE | Refills: 0 | Status: SHIPPED | OUTPATIENT
Start: 2025-01-13

## 2025-01-13 RX ORDER — METFORMIN HYDROCHLORIDE 500 MG/1
500 TABLET, EXTENDED RELEASE ORAL
Start: 2025-01-13

## 2025-01-13 RX ORDER — DULAGLUTIDE 4.5 MG/.5ML
4.5 INJECTION, SOLUTION SUBCUTANEOUS
Qty: 2 ML | Refills: 0 | Status: SHIPPED | OUTPATIENT
Start: 2025-01-19

## 2025-01-13 RX ORDER — METOCLOPRAMIDE 10 MG/1
10 TABLET ORAL 4 TIMES DAILY
Qty: 90 TABLET | Refills: 0 | Status: SHIPPED | OUTPATIENT
Start: 2025-01-13

## 2025-01-13 ASSESSMENT — ACTIVITIES OF DAILY LIVING (ADL)
BATHING: INDEPENDENT
TAKING_MEDICATION: INDEPENDENT
DRESSING: INDEPENDENT
GROCERY_SHOPPING: INDEPENDENT
DOING_HOUSEWORK: INDEPENDENT
MANAGING_FINANCES: INDEPENDENT

## 2025-01-13 ASSESSMENT — PATIENT HEALTH QUESTIONNAIRE - PHQ9
1. LITTLE INTEREST OR PLEASURE IN DOING THINGS: NOT AT ALL
SUM OF ALL RESPONSES TO PHQ9 QUESTIONS 1 AND 2: 0
2. FEELING DOWN, DEPRESSED OR HOPELESS: NOT AT ALL

## 2025-01-13 NOTE — ASSESSMENT & PLAN NOTE
Orders:    metoclopramide (Reglan) 10 mg tablet; Take 1 tablet (10 mg) by mouth 4 times a day. As needed for nausea    esomeprazole (NexIUM) 40 mg DR capsule; Take 1 capsule (40 mg) by mouth once daily in the morning. Take before meals. Do not open capsule.

## 2025-01-13 NOTE — ASSESSMENT & PLAN NOTE
Orders:    metFORMIN  mg 24 hr tablet; Take 1 tablet (500 mg) by mouth once daily in the evening. Take with meals.

## 2025-01-13 NOTE — ASSESSMENT & PLAN NOTE
Orders:    dapagliflozin propanediol (Farxiga) 10 mg; Take 1 tablet (10 mg) by mouth once daily.    Hemoglobin A1C; Future    Comprehensive Metabolic Panel; Future    Lipid Panel Non-Fasting; Future    Cholesterol, LDL Direct; Future    dulaglutide (Trulicity) 4.5 mg/0.5 mL pen injector; Inject 4.5 mg under the skin 1 (one) time per week.    metFORMIN  mg 24 hr tablet; Take 1 tablet (500 mg) by mouth once daily in the evening. Take with meals.    TSH with reflex to Free T4 if abnormal; Future

## 2025-01-13 NOTE — ASSESSMENT & PLAN NOTE
Orders:    Iron and TIBC; Future    CBC and Auto Differential; Future    TSH with reflex to Free T4 if abnormal; Future

## 2025-01-13 NOTE — PROGRESS NOTES
"Subjective   Reason for Visit: Amy Driver is an 74 y.o. female here for a Medicare Wellness visit.     Past Medical, Surgical, and Family History reviewed and updated in chart.    Reviewed all medications by prescribing practitioner or clinical pharmacist (such as prescriptions, OTCs, herbal therapies and supplements) and documented in the medical record.    HPI    Patient Care Team:  Reyna Ma DO as PCP - General (Family Medicine)  Reyna aM DO as PCP - O Medicare Advantage PCP  Nannette Albert MD as Consulting Physician (Hematology and Oncology)     Dm  Hba1c 7.6  Using med w/o issues    Due for gi follow up for EGD  Nausea every couple of days- using reglan every 2days or so    Review of Systems  All systems reviewed and neg if not noted in the HPI above     Objective   Vitals:  /70 (Patient Position: Sitting)   Pulse 87   Temp 36.1 °C (97 °F)   Resp 16   Ht 1.549 m (5' 1\")   Wt 73 kg (161 lb)   SpO2 97%   BMI 30.42 kg/m²       Physical Exam  Vitals reviewed.   Constitutional:       Appearance: Normal appearance.   HENT:      Head: Normocephalic.   Eyes:      Extraocular Movements: Extraocular movements intact.   Cardiovascular:      Rate and Rhythm: Normal rate and regular rhythm.      Heart sounds: Normal heart sounds. No murmur heard.  Pulmonary:      Effort: Pulmonary effort is normal. No respiratory distress.      Breath sounds: Normal breath sounds. No wheezing.   Abdominal:      General: Bowel sounds are normal. There is no distension.      Palpations: Abdomen is soft.   Skin:     General: Skin is warm and dry.   Neurological:      General: No focal deficit present.      Mental Status: She is alert and oriented to person, place, and time.   Psychiatric:         Mood and Affect: Mood normal.         Behavior: Behavior normal.         Thought Content: Thought content normal.         Judgment: Judgment normal.         Assessment & Plan  Encounter for Medicare annual wellness " exam         Secondary DM with CKD stage 3 and hypertension (Multi)    Orders:    dapagliflozin propanediol (Farxiga) 10 mg; Take 1 tablet (10 mg) by mouth once daily.    Hemoglobin A1C; Future    Comprehensive Metabolic Panel; Future    Lipid Panel Non-Fasting; Future    Cholesterol, LDL Direct; Future    dulaglutide (Trulicity) 4.5 mg/0.5 mL pen injector; Inject 4.5 mg under the skin 1 (one) time per week.    metFORMIN  mg 24 hr tablet; Take 1 tablet (500 mg) by mouth once daily in the evening. Take with meals.    TSH with reflex to Free T4 if abnormal; Future    Mills's esophagus without dysplasia    Orders:    metoclopramide (Reglan) 10 mg tablet; Take 1 tablet (10 mg) by mouth 4 times a day. As needed for nausea    esomeprazole (NexIUM) 40 mg DR capsule; Take 1 capsule (40 mg) by mouth once daily in the morning. Take before meals. Do not open capsule.    Gastroesophageal reflux disease without esophagitis    Orders:    esomeprazole (NexIUM) 40 mg DR capsule; Take 1 capsule (40 mg) by mouth once daily in the morning. Take before meals. Do not open capsule.    Encounter for screening for other disorder         Encounter for screening mammogram for breast cancer         Asymptomatic menopause    Orders:    XR DEXA bone density; Future    Heart failure, unspecified HF chronicity, unspecified heart failure type  - Stable  - Continue to monitor      Orders:    metFORMIN  mg 24 hr tablet; Take 1 tablet (500 mg) by mouth once daily in the evening. Take with meals.    Chronic bronchitis, unspecified chronic bronchitis type (Multi)  - Stable  - Continue to monitor           Malignant neoplasm of female breast, unspecified estrogen receptor status, unspecified laterality, unspecified site of breast  She completed radiation.   She is tolerating Arimidex.   Followed by hem/onc           Chronic kidney disease, stage 3b (Multi)  Gfr 45- stable  Labs today         Anemia, unspecified type    Orders:    Iron and  TIBC; Future    CBC and Auto Differential; Future    TSH with reflex to Free T4 if abnormal; Future    Essential hypertension with goal blood pressure less than 130/80    Orders:    metFORMIN  mg 24 hr tablet; Take 1 tablet (500 mg) by mouth once daily in the evening. Take with meals.    Class 1 obesity with serious comorbidity and body mass index (BMI) of 30.0 to 30.9 in adult, unspecified obesity type    Orders:    TSH with reflex to Free T4 if abnormal; Future           Cardiac Risk Assessment  15 - 20 minutes were spent discussing Cardiovascular risk and, if needed, lifestyle modifications were recommended, including nutritional choices, exercise, and elimination of habits contributing to risk.   Aspirin use/disuse was discussed following the guidelines below:  low dose ASA ( mg) should be considered:    If prior Heart Attack/Stroke/Peripheral vascular disease:  Generally recommend daily low dose aspirin unless extremely high bleeding risk (e.g., gastrointestinal).    If no prior Heart Attack/Stroke/Peripheral vascular disease:              Age over 70: Do not use Aspirin for prevention    Age less than 70 and 10-year cardiovascular disease risk is >20%: use low dose Aspirin for prevention.                 Depression Screening  5 - 10 minutes were spent screening for depression.    Obesity Counseling  15 - 20 minutes were spent counseling on diet and exercise interventions to address obesity and weight reduction.            Please follow up in  -  6months for HTN/DM  - 1 yr for medicare wellness   or as needed.

## 2025-01-13 NOTE — PATIENT INSTRUCTIONS
Ok for TDAP from local pharm    DM  - Labs today  - Follow up with eye team yearly  - increased trulicity to 4.5    Call for mammo and dexa- Due in MAY    To call GI for follow up (Dr. Farias) for EGD if needed    Blood type = A + blood type    Continue to work on healthy diet and exercise  We encourage all patients to engage in exercise 150 minutes per week   Adults 18 and older, activity during each week - if you are able:    Engage in at least 150 minutes of moderate or vigorous exercise per week   If you are able- Engage in muscle strengthening activity on 2 or more days per week        Please follow up in:  -  6months for HTN/DM  - 1 yr for medicare wellness   or as needed.       ** If labs or imaging ordered at today's visit, all the non-urgent results will be discussed at your next visit    If you have been referred for a special test or to a specialist please call  [phone] to schedule an appointment.  If you have any further questions, or if develop new or worsened symptoms, please give our office a call at [phone].

## 2025-01-13 NOTE — ASSESSMENT & PLAN NOTE
- Stable  - Continue to monitor      Orders:    metFORMIN  mg 24 hr tablet; Take 1 tablet (500 mg) by mouth once daily in the evening. Take with meals.

## 2025-01-23 NOTE — TELEPHONE ENCOUNTER
Patient called in asking if you would like her to get a traditional colonoscopy or if you would like her do the box cologuard     Please advise, TY

## 2025-01-23 NOTE — TELEPHONE ENCOUNTER
Recommend colonoscopy  She can ask GI to do both the EGD (upper scope) and colonoscopy at the same visit

## 2025-01-28 ENCOUNTER — TELEPHONE (OUTPATIENT)
Dept: PRIMARY CARE | Facility: CLINIC | Age: 75
End: 2025-01-28

## 2025-01-28 DIAGNOSIS — K21.9 GASTROESOPHAGEAL REFLUX DISEASE WITHOUT ESOPHAGITIS: ICD-10-CM

## 2025-01-28 DIAGNOSIS — K22.70 BARRETT'S ESOPHAGUS WITHOUT DYSPLASIA: Primary | ICD-10-CM

## 2025-01-28 NOTE — TELEPHONE ENCOUNTER
Patient has referral for colonoscopy   States you wanted her to have an acid reflux test at the same time. Needs a referral for that  Please place  TY

## 2025-02-26 DIAGNOSIS — I12.9 SECONDARY DM WITH CKD STAGE 3 AND HYPERTENSION (MULTI): ICD-10-CM

## 2025-02-26 DIAGNOSIS — N18.30 SECONDARY DM WITH CKD STAGE 3 AND HYPERTENSION (MULTI): ICD-10-CM

## 2025-02-26 DIAGNOSIS — E13.22 SECONDARY DM WITH CKD STAGE 3 AND HYPERTENSION (MULTI): ICD-10-CM

## 2025-02-26 RX ORDER — DULAGLUTIDE 4.5 MG/.5ML
INJECTION, SOLUTION SUBCUTANEOUS
Qty: 6 ML | Refills: 1 | Status: SHIPPED | OUTPATIENT
Start: 2025-02-26

## 2025-03-21 ENCOUNTER — HOSPITAL ENCOUNTER (OUTPATIENT)
Dept: RADIATION ONCOLOGY | Facility: CLINIC | Age: 75
Setting detail: RADIATION/ONCOLOGY SERIES
Discharge: HOME | End: 2025-03-21
Payer: MEDICARE

## 2025-03-21 VITALS
TEMPERATURE: 97 F | WEIGHT: 159.8 LBS | BODY MASS INDEX: 30.19 KG/M2 | RESPIRATION RATE: 18 BRPM | DIASTOLIC BLOOD PRESSURE: 74 MMHG | OXYGEN SATURATION: 98 % | HEART RATE: 60 BPM | SYSTOLIC BLOOD PRESSURE: 172 MMHG

## 2025-03-21 DIAGNOSIS — Z51.81 ENCOUNTER FOR MONITORING AROMATASE INHIBITOR THERAPY: Primary | ICD-10-CM

## 2025-03-21 DIAGNOSIS — C50.411 MALIGNANT NEOPLASM OF UPPER-OUTER QUADRANT OF RIGHT BREAST IN FEMALE, ESTROGEN RECEPTOR POSITIVE: ICD-10-CM

## 2025-03-21 DIAGNOSIS — Z17.0 MALIGNANT NEOPLASM OF UPPER-OUTER QUADRANT OF RIGHT BREAST IN FEMALE, ESTROGEN RECEPTOR POSITIVE: ICD-10-CM

## 2025-03-21 DIAGNOSIS — Z79.811 ENCOUNTER FOR MONITORING AROMATASE INHIBITOR THERAPY: Primary | ICD-10-CM

## 2025-03-21 PROCEDURE — 99213 OFFICE O/P EST LOW 20 MIN: CPT | Performed by: NURSE PRACTITIONER

## 2025-03-21 ASSESSMENT — PAIN SCALES - GENERAL: PAINLEVEL_OUTOF10: 0-NO PAIN

## 2025-03-21 NOTE — PROGRESS NOTES
Radiation Oncology Follow-Up    Patient Name:  Amy Driver  MRN:  [id number]  :  1950    Referring Provider: Leah Ramirez, APR*  Primary Care Provider: Reyna Ma DO  Care Team: Patient Care Team:  Reyna Ma DO as PCP - General (Family Medicine)  Reyna Ma DO as PCP - O Medicare Advantage PCP  Nannette Albert MD as Consulting Physician (Hematology and Oncology)    Date of Service: 3/21/2025   74-year-old female who underwent a routine screening mammogram on 2023, which revealed an enlarging spiculated mass in the upper outer quadrant of the right breast with associated pleomorphic calcifications.  Diagnostic views performed 2023 revealed a 3.3 x 2.3 x 1.8 cm irregular mass with associated calcifications.      She underwent a right breast ultrasound directed at the 10 o'clock position, 7 cm from the nipple which revealed a 2.8 x 1.6 x 3 cm heterogeneous mass which had been biopsied in the past and is consistent with a hamartoma.  At the 11 o'clock position, 4 cm from the nipple was a 1.9 x 2 x 1.7 cm hypoechoic mass.  Axillary ultrasound revealed 2 normal appearing lymph nodes.      On 2023 she underwent a right breast core biopsy directed at the 11 o'clock position, 4 cm from the nipple which revealed invasive ductal carcinoma, grade 2.  Estrogen and progesterone receptors stained positive at greater than 95%.  HER2/itz was 2+ on IHC and 1.2 on dual-velia.      On 2023 she underwent a right partial mastectomy.  Pathology revealed a 2 cm invasive ductal carcinoma, grade 3.  No angiolymphatic invasion was present.  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  There was no element of extensive intraductal component.  The resection margins were negative with tumor 0.12 cm from the lateral margin.       23 - 23: Radiation to the right breast consisting of a dose of 42.56 Gy with additional 10 Gy to the  tumor bed for a total of 52.56 Gy.     Adjuvant endocrine therapy with anastrozole.     SUBJECTIVE  History of Present Illness:   Amy Driver is here today for routine radiation follow up/surveillance visit, She is doing well and reports right breast well healed and skin is intact. Using cocoa butter routinely.  No swelling of right arm or difficulty with ROM.  No headaches, fever, chills, cough, SOB, chest pain, N/V or bony pain.  She continues anastrozole and no significant adverse effects.  Mammogram in May without evidence for malignancy.     Review of Systems:    Review of Systems   All other systems reviewed and are negative.    Performance Status:   The Karnofsky performance scale today is 100, Fully active, able to carry on all pre-disease performed without restriction (ECOG equivalent 0).      OBJECTIVE    Current Outpatient Medications:     amLODIPine (Norvasc) 10 mg tablet, Take 1 tablet (10 mg) by mouth once daily., Disp: 90 tablet, Rfl: 3    anastrozole (Arimidex) 1 mg tablet, Take one tablet daily, Disp: 90 tablet, Rfl: 3    aspirin 81 mg EC tablet, Take 1 tablet (81 mg) by mouth once daily., Disp: , Rfl:     atorvastatin (Lipitor) 20 mg tablet, TAKE ONE TABLET BY MOUTH EVERY DAY, Disp: 90 tablet, Rfl: 1    carvedilol (Coreg) 25 mg tablet, Take 1 tablet (25 mg) by mouth 2 times daily (morning and late afternoon)., Disp: 180 tablet, Rfl: 3    dapagliflozin propanediol (Farxiga) 10 mg, Take 1 tablet (10 mg) by mouth once daily., Disp: 90 tablet, Rfl: 1    dulaglutide (Trulicity) 4.5 mg/0.5 mL pen injector, INJECT 4.5MG SUBCUTANEOUSLY ONCE A WEEK, Disp: 6 mL, Rfl: 1    esomeprazole (NexIUM) 40 mg DR capsule, Take 1 capsule (40 mg) by mouth once daily in the morning. Take before meals. Do not open capsule., Disp: 90 capsule, Rfl: 0    ezetimibe (Zetia) 10 mg tablet, Take 1 tablet (10 mg) by mouth once daily., Disp: 90 tablet, Rfl: 3    furosemide (Lasix) 20 mg tablet, Take 1 tablet (20 mg) by mouth once  daily., Disp: 90 tablet, Rfl: 3    lisinopril 40 mg tablet, Take 1 tablet (40 mg) by mouth once daily., Disp: , Rfl:     metFORMIN  mg 24 hr tablet, Take 1 tablet (500 mg) by mouth once daily in the evening. Take with meals., Disp: , Rfl:     metoclopramide (Reglan) 10 mg tablet, Take 1 tablet (10 mg) by mouth 4 times a day. As needed for nausea, Disp: 90 tablet, Rfl: 0    multivitamin-Ca-iron-minerals (Tab-A-Valerio Womens) 27-0.4 mg tablet, Take 1 tablet by mouth once daily., Disp: , Rfl:     OneTouch Ultra Test strip, test 2 to 3 times daily as directed, Disp: , Rfl:     spironolactone (Aldactone) 25 mg tablet, Take 1 tablet (25 mg) by mouth once daily., Disp: 90 tablet, Rfl: 3    sucralfate (Carafate) 1 gram tablet, Take by mouth every 6 hours., Disp: , Rfl:     tretinoin (Retin-A) 0.025 % cream, 1 Application, Disp: , Rfl:     triamterene-hydrochlorothiazid (Maxzide) 75-50 mg tablet, Take 1 tablet by mouth once daily., Disp: , Rfl:      Physical Exam  Constitutional:       Appearance: Normal appearance.   HENT:      Head: Normocephalic and atraumatic.      Nose: Nose normal.      Mouth/Throat:      Mouth: Mucous membranes are moist.      Pharynx: Oropharynx is clear.   Eyes:      Conjunctiva/sclera: Conjunctivae normal.      Pupils: Pupils are equal, round, and reactive to light.   Cardiovascular:      Rate and Rhythm: Normal rate.      Heart sounds: Normal heart sounds.   Pulmonary:      Effort: Pulmonary effort is normal.      Breath sounds: Normal breath sounds.   Chest:   Breasts:     Right: Normal. No swelling, inverted nipple, mass or nipple discharge.      Left: Normal. No swelling, inverted nipple, mass, nipple discharge or skin change.      Comments: Right breast with well healed surgical incision. Skin is intact.   Abdominal:      Palpations: Abdomen is soft.   Musculoskeletal:         General: No swelling. Normal range of motion.      Cervical back: Normal range of motion and neck supple.    Lymphadenopathy:      Cervical: No cervical adenopathy.      Upper Body:      Right upper body: No supraclavicular or axillary adenopathy.      Left upper body: No supraclavicular or axillary adenopathy.   Skin:     General: Skin is warm and dry.   Neurological:      General: No focal deficit present.      Mental Status: She is alert and oriented to person, place, and time.   Psychiatric:         Mood and Affect: Mood normal.         Behavior: Behavior normal.        Narrative & Impression   Interpreted By:  Vika Miranda,   STUDY:  BI MAMMO BILATERAL DIAGNOSTIC TOMOSYNTHESIS;  5/1/2024 12:10 pm      ACCESSION NUMBER(S):  [id number]      ORDERING CLINICIAN:  MAIN ELIZONDO      INDICATION:  History of right lumpectomy and radiation.      COMPARISON:  08/24/2023, 08/21/2023, 06/01/2023, 05/01/2023, and priors dating  back to 2020      FINDINGS:  And tomosynthesis images were reviewed at 1 mm slice thickness.      Density:  The breast tissue is heterogeneously dense, which may  obscure small masses.      Postlumpectomy changes including parenchymal scarring and surgical  clips are seen in the superolateral right breast at middle depth.  Diffuse right breast skin thickening is present, in keeping with  history of radiation treatment. There has been no change in the  parenchymal pattern. No suspicious masses or calcifications are  identified in either breast.      IMPRESSION:  No mammographic evidence of malignancy.      BI-RADS CATEGORY:      BI-RADS Category:  2 Benign.  Recommendation:  Annual Screening.  Recommended Date:  1 Year.  Laterality:  Bilateral.      ASSESSMENT/PLAN:  74 y.o. female with early stage right breast cancer s/p breast conserving surgery followed by radiation. Doing well.  She will follow up with Dr. Elizondo for mammogram.  Med onc follow up with Arin Montejo CNP.  Radiation follow up in 12 mo.  Call with any questions or concerns.     Lyn Ramirez CNP  [phone]

## 2025-04-02 DIAGNOSIS — I12.9 SECONDARY DM WITH CKD STAGE 3 AND HYPERTENSION (MULTI): ICD-10-CM

## 2025-04-02 DIAGNOSIS — I50.9 HEART FAILURE, UNSPECIFIED HF CHRONICITY, UNSPECIFIED HEART FAILURE TYPE: ICD-10-CM

## 2025-04-02 DIAGNOSIS — I10 ESSENTIAL HYPERTENSION WITH GOAL BLOOD PRESSURE LESS THAN 130/80: ICD-10-CM

## 2025-04-02 DIAGNOSIS — N18.30 SECONDARY DM WITH CKD STAGE 3 AND HYPERTENSION (MULTI): ICD-10-CM

## 2025-04-02 DIAGNOSIS — E13.22 SECONDARY DM WITH CKD STAGE 3 AND HYPERTENSION (MULTI): ICD-10-CM

## 2025-04-03 RX ORDER — METFORMIN HYDROCHLORIDE 500 MG/1
500 TABLET, EXTENDED RELEASE ORAL 2 TIMES DAILY
Qty: 180 TABLET | Refills: 3 | Status: SHIPPED | OUTPATIENT
Start: 2025-04-03

## 2025-04-03 RX ORDER — FUROSEMIDE 20 MG/1
20 TABLET ORAL DAILY
Qty: 90 TABLET | Refills: 3 | Status: SHIPPED | OUTPATIENT
Start: 2025-04-03

## 2025-04-10 DIAGNOSIS — I70.0 AORTIC ATHEROSCLEROSIS (CMS-HCC): ICD-10-CM

## 2025-04-10 DIAGNOSIS — I10 ESSENTIAL HYPERTENSION WITH GOAL BLOOD PRESSURE LESS THAN 130/80: ICD-10-CM

## 2025-04-10 DIAGNOSIS — E78.5 HYPERLIPIDEMIA, UNSPECIFIED HYPERLIPIDEMIA TYPE: ICD-10-CM

## 2025-04-10 DIAGNOSIS — K22.70 BARRETT'S ESOPHAGUS WITHOUT DYSPLASIA: ICD-10-CM

## 2025-04-10 DIAGNOSIS — K21.9 GASTROESOPHAGEAL REFLUX DISEASE WITHOUT ESOPHAGITIS: ICD-10-CM

## 2025-04-10 DIAGNOSIS — I50.9 HEART FAILURE, UNSPECIFIED HF CHRONICITY, UNSPECIFIED HEART FAILURE TYPE: ICD-10-CM

## 2025-04-14 DIAGNOSIS — E78.5 HYPERLIPIDEMIA, UNSPECIFIED HYPERLIPIDEMIA TYPE: ICD-10-CM

## 2025-04-14 DIAGNOSIS — I70.0 AORTIC ATHEROSCLEROSIS (CMS-HCC): ICD-10-CM

## 2025-04-14 DIAGNOSIS — I50.9 HEART FAILURE, UNSPECIFIED HF CHRONICITY, UNSPECIFIED HEART FAILURE TYPE: ICD-10-CM

## 2025-04-14 RX ORDER — EZETIMIBE 10 MG/1
10 TABLET ORAL DAILY
Qty: 90 TABLET | Refills: 3 | OUTPATIENT
Start: 2025-04-14

## 2025-04-15 RX ORDER — ESOMEPRAZOLE MAGNESIUM 40 MG/1
40 CAPSULE, DELAYED RELEASE ORAL
Qty: 90 CAPSULE | Refills: 3 | Status: SHIPPED | OUTPATIENT
Start: 2025-04-15

## 2025-04-15 RX ORDER — AMLODIPINE BESYLATE 10 MG/1
10 TABLET ORAL DAILY
Qty: 90 TABLET | Refills: 3 | Status: SHIPPED | OUTPATIENT
Start: 2025-04-15

## 2025-04-15 RX ORDER — EZETIMIBE 10 MG/1
10 TABLET ORAL DAILY
Qty: 90 TABLET | Refills: 3 | Status: SHIPPED | OUTPATIENT
Start: 2025-04-15 | End: 2026-04-10

## 2025-04-17 DIAGNOSIS — I12.9 SECONDARY DM WITH CKD STAGE 3 AND HYPERTENSION (MULTI): ICD-10-CM

## 2025-04-17 DIAGNOSIS — E13.22 SECONDARY DM WITH CKD STAGE 3 AND HYPERTENSION (MULTI): ICD-10-CM

## 2025-04-17 DIAGNOSIS — I10 ESSENTIAL HYPERTENSION WITH GOAL BLOOD PRESSURE LESS THAN 130/80: ICD-10-CM

## 2025-04-17 DIAGNOSIS — I50.9 HEART FAILURE, UNSPECIFIED HF CHRONICITY, UNSPECIFIED HEART FAILURE TYPE: ICD-10-CM

## 2025-04-17 DIAGNOSIS — N18.30 SECONDARY DM WITH CKD STAGE 3 AND HYPERTENSION (MULTI): ICD-10-CM

## 2025-04-17 RX ORDER — METFORMIN HYDROCHLORIDE 500 MG/1
500 TABLET, EXTENDED RELEASE ORAL
Qty: 90 TABLET | Refills: 0 | Status: SHIPPED | OUTPATIENT
Start: 2025-04-17 | End: 2025-04-19 | Stop reason: SINTOL

## 2025-04-17 NOTE — TELEPHONE ENCOUNTER
Is she using 1 tablet twice a day?    If so, have her hold the metformin for the next week.  Then have her restart the metformin at 1 tablet once daily.  If she still gets the GI upset let me know and we can stop it altogether      If she is only using 1 tablet daily-okay to come off to stop using altogether

## 2025-04-17 NOTE — TELEPHONE ENCOUNTER
Patient called stating she is having some side effects from the metformin nausea and diarrhea. Would like to know what she should do.    Please advise, TY

## 2025-04-19 NOTE — TELEPHONE ENCOUNTER
Yes- ok to stop  Check her sugars at home and let me know if they are higher  Let me know if still with GI issues after stopping

## 2025-05-01 ENCOUNTER — APPOINTMENT (OUTPATIENT)
Dept: HEMATOLOGY/ONCOLOGY | Facility: CLINIC | Age: 75
End: 2025-05-01
Payer: MEDICARE

## 2025-05-01 ENCOUNTER — APPOINTMENT (OUTPATIENT)
Dept: RADIOLOGY | Facility: CLINIC | Age: 75
End: 2025-05-01
Payer: MEDICARE

## 2025-05-01 RX ORDER — ATORVASTATIN CALCIUM 20 MG/1
20 TABLET, FILM COATED ORAL DAILY
Qty: 90 TABLET | Refills: 1 | Status: SHIPPED | OUTPATIENT
Start: 2025-05-01

## 2025-05-20 ENCOUNTER — HOSPITAL ENCOUNTER (OUTPATIENT)
Dept: RADIOLOGY | Facility: CLINIC | Age: 75
Discharge: HOME | End: 2025-05-20
Payer: MEDICARE

## 2025-05-20 VITALS — WEIGHT: 165 LBS | BODY MASS INDEX: 32.39 KG/M2 | HEIGHT: 60 IN

## 2025-05-20 DIAGNOSIS — Z12.31 ENCOUNTER FOR SCREENING MAMMOGRAM FOR MALIGNANT NEOPLASM OF BREAST: ICD-10-CM

## 2025-05-20 PROCEDURE — 77067 SCR MAMMO BI INCL CAD: CPT

## 2025-05-20 PROCEDURE — 77067 SCR MAMMO BI INCL CAD: CPT | Performed by: STUDENT IN AN ORGANIZED HEALTH CARE EDUCATION/TRAINING PROGRAM

## 2025-05-20 PROCEDURE — 77063 BREAST TOMOSYNTHESIS BI: CPT | Performed by: STUDENT IN AN ORGANIZED HEALTH CARE EDUCATION/TRAINING PROGRAM

## 2025-05-21 ENCOUNTER — APPOINTMENT (OUTPATIENT)
Dept: GASTROENTEROLOGY | Facility: CLINIC | Age: 75
End: 2025-05-21
Payer: MEDICARE

## 2025-05-23 ENCOUNTER — OFFICE VISIT (OUTPATIENT)
Dept: HEMATOLOGY/ONCOLOGY | Facility: CLINIC | Age: 75
End: 2025-05-23
Payer: MEDICARE

## 2025-05-23 VITALS
DIASTOLIC BLOOD PRESSURE: 77 MMHG | RESPIRATION RATE: 16 BRPM | OXYGEN SATURATION: 99 % | HEART RATE: 83 BPM | TEMPERATURE: 98.1 F | SYSTOLIC BLOOD PRESSURE: 138 MMHG

## 2025-05-23 DIAGNOSIS — C50.411 MALIGNANT NEOPLASM OF UPPER-OUTER QUADRANT OF RIGHT BREAST IN FEMALE, ESTROGEN RECEPTOR POSITIVE: ICD-10-CM

## 2025-05-23 DIAGNOSIS — Z08 ENCOUNTER FOR FOLLOW-UP SURVEILLANCE OF BREAST CANCER: ICD-10-CM

## 2025-05-23 DIAGNOSIS — Z51.81 ENCOUNTER FOR MONITORING AROMATASE INHIBITOR THERAPY: ICD-10-CM

## 2025-05-23 DIAGNOSIS — C50.919 MALIGNANT NEOPLASM OF FEMALE BREAST, UNSPECIFIED ESTROGEN RECEPTOR STATUS, UNSPECIFIED LATERALITY, UNSPECIFIED SITE OF BREAST: ICD-10-CM

## 2025-05-23 DIAGNOSIS — Z79.811 ENCOUNTER FOR MONITORING AROMATASE INHIBITOR THERAPY: ICD-10-CM

## 2025-05-23 DIAGNOSIS — Z98.890 HISTORY OF LUMPECTOMY OF RIGHT BREAST: ICD-10-CM

## 2025-05-23 DIAGNOSIS — R92.8 ABNORMAL MAMMOGRAM OF LEFT BREAST: Primary | ICD-10-CM

## 2025-05-23 DIAGNOSIS — Z85.3 ENCOUNTER FOR FOLLOW-UP SURVEILLANCE OF BREAST CANCER: ICD-10-CM

## 2025-05-23 DIAGNOSIS — Z92.3 HISTORY OF EXTERNAL BEAM RADIATION THERAPY: ICD-10-CM

## 2025-05-23 DIAGNOSIS — Z17.0 MALIGNANT NEOPLASM OF UPPER-OUTER QUADRANT OF RIGHT BREAST IN FEMALE, ESTROGEN RECEPTOR POSITIVE: ICD-10-CM

## 2025-05-23 PROCEDURE — 99215 OFFICE O/P EST HI 40 MIN: CPT | Performed by: NURSE PRACTITIONER

## 2025-05-23 PROCEDURE — 1159F MED LIST DOCD IN RCRD: CPT | Performed by: NURSE PRACTITIONER

## 2025-05-23 PROCEDURE — 3078F DIAST BP <80 MM HG: CPT | Performed by: NURSE PRACTITIONER

## 2025-05-23 PROCEDURE — 3075F SYST BP GE 130 - 139MM HG: CPT | Performed by: NURSE PRACTITIONER

## 2025-05-23 PROCEDURE — 1126F AMNT PAIN NOTED NONE PRSNT: CPT | Performed by: NURSE PRACTITIONER

## 2025-05-23 PROCEDURE — 1160F RVW MEDS BY RX/DR IN RCRD: CPT | Performed by: NURSE PRACTITIONER

## 2025-05-23 RX ORDER — ANASTROZOLE 1 MG/1
TABLET ORAL
Qty: 90 TABLET | Refills: 3 | Status: SHIPPED | OUTPATIENT
Start: 2025-05-23

## 2025-05-23 ASSESSMENT — PAIN SCALES - GENERAL: PAINLEVEL_OUTOF10: 0-NO PAIN

## 2025-05-23 NOTE — PROGRESS NOTES
Oncology Follow-Up    Amy Driver  [id number]        Cancer Staging   Breast cancer  Staging form: Breast, AJCC 8th Edition  - Pathologic stage from 10/2/2023: Stage Unknown (pT1c, pNX, cM0, G3, ER+, DE+, HER2-, Oncotype DX score: 23) - Signed by Heike River MD on 10/2/2023    Oncology History   Breast cancer   10/1/2023 Initial Diagnosis    Breast cancer (CMS/HCC)     10/2/2023 Cancer Staged    Staging form: Breast, AJCC 8th Edition, Pathologic stage from 10/2/2023: Stage Unknown (pT1c, pNX, cM0, G3, ER+, DE+, HER2-, Oncotype DX score: 23) - Signed by Heike River MD on 10/2/2023       73 y.o. postmenopausal AA female with right-sided invasive ductal carcinoma, stage IA (T1c NX M0).  The patient's breast cancer was diagnosed on July 7, 2023, and is grade 2, estrogen receptor positive at >95%, progesterone receptor >95%, and HER-2/itz equivocal and not amplified via FISH.        ONCOLOGIC HISTORY:  Breast cancer (CMS/HCC), Pathologic: Stage Unknown (pT1c, pNX, cM0, G3, ER+, DE+, HER2-, Oncotype DX score: 23)       TREATMENT HISTORY  05/01/2023: Patient underwent bilateral screening mammogram. This revealed a spiculated mas in the UOQ of the right breast associated with pleomorphic calcifications  05/02/2023: Patient underwent a right diagnostic mammogram and US. This confirmed a mass 3.3 x 2.3 x 1.8 cm with calcifications. US revealed a mass in the right breast at 10:00, 7 cm from the nipple measuring 2.8 x 1.6 x 3 cm which had been biopsied in the past and was consistent with a hamartoma. In addition, at 11:00, 4 cm from the nipple was a 1.9 x 2 x 1.7 cm mass. Right axillary US revealed 2 normal looking LN.  07/07/2023: Patient underwent an US-guided core needle biopsy of the mass at 11:00, 4 cm from the nipple. Pathology was consistent with invasive ductal carcinoma with results as above.   08/24/2023:  Patient underwent a right partial mastectomy without SLNB. Pathology was consistent with a 2 cm invasive  carcinoma, grade 3, with clear margins  11/27/23 - 12/27/23: Radiation to the right breast consisting of a dose of 42.56 Gy with additional 10 Gy to the tumor bed for a total of 52.56 Gy.    01/2024 Started Adjuvant endocrine therapy with anastrozole.     Dandre Reyes presents for her Oncology Follow Up Visit. She feels well and is tolerating anastrozole well. Amy is doing monthly Breast self exams. She walks daily for exercise. Amy rates her energy level as 10/10 and reports no distress.  She is scheduling her next colonoscopy soon. She had a GYN visit last year and is up to date on her vaccines.   Amy denies any unusual headaches, balance issues, depression, cough, shortness of breath, problems swallowing, changes in chest/breast area, abdominal pain, bone or muscle pain, vaginal bleeding, rectal bleeding, blood in the urine, vaginal dryness, swelling arms or legs, new or unusual skin moles or lesions.         Objective      Vitals:    05/23/25 1435   BP: 138/77   Pulse: 83   Temp: 36.7 °C (98.1 °F)   SpO2: 99%        Constitutional: Well developed, alert/oriented x3, no distress, cooperative   Eyes: clear sclera   ENMT: mucous membranes moist, no apparent lesions   Head/Neck: Neck supple, no bruits   Respiratory/Thorax: Patent airways, normal breath sounds with good chest expansion   Cardiovascular: Regular rate and rhythm, no murmurs, 2+ equal pulses of the extremities,   Gastrointestinal: Nondistended, soft, non-tender, no masses palpable, no organomegaly   Musculoskeletal: ROM intact, no joint swelling, normal strength   Extremities: normal extremities, no edema, cyanosis, contusions or wounds   Neurological: alert and oriented x3,  normal strength   Breast:     Lymphatic: No significant lymphadenopathy   Psychological: Appropriate mood and behavior   Skin: Warm and dry, no lesions, no rashes      Physical Exam  Chest:          Comments: Right breast positive for breast conserving surgery with  well healed UOQ and axillary incisions; no masses, nodules, skin changes, discharge.  Left breast without masses, nodules, skin changes, discharge.          Lab Results   Component Value Date    WBC 5.5 01/13/2025    HGB 13.1 01/13/2025    HCT 40.1 01/13/2025    MCV 93 01/13/2025     01/13/2025       Chemistry    Lab Results   Component Value Date/Time     01/13/2025 1429    K 4.3 01/13/2025 1429     01/13/2025 1429    CO2 26 01/13/2025 1429    BUN 20 01/13/2025 1429    CREATININE 1.11 (H) 01/13/2025 1429    Lab Results   Component Value Date/Time    CALCIUM 10.3 01/13/2025 1429    ALKPHOS 77 01/13/2025 1429    AST 16 01/13/2025 1429    ALT 13 01/13/2025 1429    BILITOT 0.4 01/13/2025 1429          Imaging:    Exam Information    Status Exam Begun Exam Ended   Final 5/20/2025 14:09 5/20/2025 14:30     Study Result    Narrative & Impression   Interpreted By:  Calixto Giang,   STUDY:  BI MAMMO BILATERAL SCREENING TOMOSYNTHESIS;  5/20/2025 2:30 pm      ACCESSION NUMBER(S):  [id number]      ORDERING CLINICIAN:  ELIN MOLINA      INDICATION:  Screening. History of right lumpectomy for DCIS and radiation therapy.      ,Z12.31 Encounter for screening mammogram for malignant neoplasm of  breast      COMPARISON:  05/01/2024, 05/01/2023, 01/04/2022      FINDINGS:  2D and tomosynthesis images were reviewed at 1 mm slice thickness.      Density:  The breasts are heterogeneously dense, which may obscure  small masses.      Postsurgical changes in the upper outer right breast at middle depth.  There is an asymmetry in the medial aspect of the lumpectomy bed  which may be related to post surgical changes. Stable right breast  post radiation changes. Stable benign circumscribed masses in the  left breast. No suspicious masses or calcifications are identified in  the left breast.      IMPRESSION:  Right breast asymmetry for which additional diagnostic mammography  and possible ultrasound images are recommended.       No mammographic evidence of malignancy in the left breast.      BI-RADS CATEGORY:  BI-RADS Category:  0 Incomplete; Need Additional Imaging Evaluation  Recommendation:  Additional Imaging.  Recommended Date:  Immediate.  Laterality:  Right.     Assessment/Plan    Amy is a 76 yo woman with a hx of T1cNX G-3 right IDC diagnosed October 2023. She is s/p partial mastectomy, XRT, and is currently on anastrozole with good tolerance. There is no evidence of recurrent disease on today's exam, however, an asymmetry of the right breast was found on her mammogram today (Cat0). She will need additional films.    Plan:  Exam is negative.  Continue anastrozole 1mg daily.  Amy will check with her niece, who is her , and call to schedule her follow up imaging.  I will call with bone density results later this month once completed.  Encouraged monthly breast self exams, plant based diet, keep alcohol <3 drinks/week, exercise at least 2.5 hours/week.  We reviewed signs/symptoms of recurrence including new masses, new pigmented lesion, tugging or pulling of the skin, nipple discharge, rash in or around the chest area, or any new finding that doesn't resolve within a 2-3 weeks.  All of Amy's questions/concerns were addressed.  Over 25 minutes of time was spent with this patient with >50% of the time with education, counseling, and coordination of care.   I will see Melody back in November.    Diagnoses and all orders for this visit:  Abnormal mammogram of left breast  -     Clinic Appointment Request Follow Up; [facility]; Future  -     BI mammo right diagnostic tomosynthesis; Future  Malignant neoplasm of upper-outer quadrant of right breast in female, estrogen receptor positive  Encounter for monitoring aromatase inhibitor therapy  Encounter for follow-up surveillance of breast cancer  History of external beam radiation therapy  History of lumpectomy of right breast        Lina Junior, APRN-CNP

## 2025-05-23 NOTE — PATIENT INSTRUCTIONS
Please call us at [phone] then follow the prompts.    1. Exercise 2.5 hours per week; bone strengthening, cardio-vascular, resistance training.  2. Please do self breast exams monthly.  3. Keep alcohol under 3 drinks per week.  4. Sun safety - limit sun exposure from 11a-2p when its at its hottest, apply 15-30 sun block and re-apply every 1-2 hours if perspiring or swimming.  5. Eat a plant based diet, add in oily fishes such as mackerel, tuna, and salmon.  6. Get in at least 1,000 mg of calcium per day through diet or supplement for bone strength. Examples of foods higher in calcium are milk, yogurt, fruited yogurt, oranges, fortified orange juice, almonds, almond milk, broccoli, spinach, bok antoine, mustard greens, puddings, custards, ice cream, fortified cereals, bars, and crackers.   7. Please continue on your anastrozole 1 mg tablet daily.  8. Please call the office if any new mass or rash in or around breast, or any uncontrolled symptoms that last over 2-3 weeks at [phone].  9. Please schedule right breast diagnostic mammogram and they may do an US.  10. It was nice seeing you today, Amy. I will see you back 6 months. Thank you for choosing [facility] with your care. Have a nice spring and summer!

## 2025-05-28 ENCOUNTER — HOSPITAL ENCOUNTER (OUTPATIENT)
Dept: RADIOLOGY | Facility: CLINIC | Age: 75
Discharge: HOME | End: 2025-05-28
Payer: MEDICARE

## 2025-05-28 DIAGNOSIS — Z78.0 ASYMPTOMATIC MENOPAUSE: ICD-10-CM

## 2025-05-28 PROCEDURE — 77080 DXA BONE DENSITY AXIAL: CPT | Performed by: RADIOLOGY

## 2025-05-28 PROCEDURE — 77080 DXA BONE DENSITY AXIAL: CPT

## 2025-06-05 DIAGNOSIS — R92.8 ABNORMALITY OF RIGHT BREAST ON SCREENING MAMMOGRAM: Primary | ICD-10-CM

## 2025-06-06 ENCOUNTER — TELEPHONE (OUTPATIENT)
Dept: PRIMARY CARE | Facility: CLINIC | Age: 75
End: 2025-06-06
Payer: MEDICARE

## 2025-06-06 ENCOUNTER — APPOINTMENT (OUTPATIENT)
Dept: RADIOLOGY | Facility: CLINIC | Age: 75
End: 2025-06-06
Payer: MEDICARE

## 2025-06-06 NOTE — TELEPHONE ENCOUNTER
----- Message from Helen CANCINO sent at 6/5/2025  8:41 AM EDT -----  Patient scheduled 6/9/25 for radiology. Patient had bone density done and would like to now the results.  ----- Message -----  From: Reyna Ma DO  Sent: 6/5/2025   7:54 AM EDT  To: Helen Hu MA    The radiology team would like a better picture of the breast on the right with better imaging    Looks like your breast team have order this already--- please be sure to call for appointment, if not already done with our radiology department.  If you can, try to make this appointment with one of our breast center either at [facility] or [facility].    Call  [phone]  to schedule an appointment.     Once your results are back I will let you know    Let me know if you have any further questions    Dr. Ma   ----- Message -----  From: Interface, Radiology Results In  Sent: 5/22/2025  10:32 PM EDT  To: Reyna Ma DO

## 2025-06-06 NOTE — TELEPHONE ENCOUNTER
Pls read the [employer] message I sent to her on 5/28/25:       Your Dexa (bone density)  scan showed Osteopenia.  I am recommending that you increase your calcium rich foods (you will need at least 1200 mg every day- see below for sources). You will also need to add Vitamin D3 2000iu daily (this is OTC)  as well as exercise to help make your bones stronger. Please read the information below regarding the cause, symptoms, diagnosis and treatment of osteopenia to learn more about this.

## 2025-06-09 ENCOUNTER — HOSPITAL ENCOUNTER (OUTPATIENT)
Dept: RADIOLOGY | Facility: CLINIC | Age: 75
Discharge: HOME | End: 2025-06-09
Payer: MEDICARE

## 2025-06-09 DIAGNOSIS — R92.8 ABNORMAL MAMMOGRAM OF LEFT BREAST: ICD-10-CM

## 2025-06-09 PROCEDURE — G0279 TOMOSYNTHESIS, MAMMO: HCPCS | Mod: RIGHT SIDE | Performed by: STUDENT IN AN ORGANIZED HEALTH CARE EDUCATION/TRAINING PROGRAM

## 2025-06-09 PROCEDURE — 77061 BREAST TOMOSYNTHESIS UNI: CPT | Mod: RT

## 2025-06-09 PROCEDURE — 77065 DX MAMMO INCL CAD UNI: CPT | Mod: RIGHT SIDE | Performed by: STUDENT IN AN ORGANIZED HEALTH CARE EDUCATION/TRAINING PROGRAM

## 2025-06-10 ENCOUNTER — TELEPHONE (OUTPATIENT)
Dept: HEMATOLOGY/ONCOLOGY | Facility: CLINIC | Age: 75
End: 2025-06-10
Payer: MEDICARE

## 2025-06-10 NOTE — TELEPHONE ENCOUNTER
Nurse called to update patient on benign mammogram results. Patient expressing thanks for call and will continue with schedule follow up appts and yearly mammograms.    MINOR JALLOH RN

## 2025-07-14 DIAGNOSIS — I12.9 SECONDARY DM WITH CKD STAGE 3 AND HYPERTENSION (MULTI): ICD-10-CM

## 2025-07-14 DIAGNOSIS — N18.30 SECONDARY DM WITH CKD STAGE 3 AND HYPERTENSION (MULTI): ICD-10-CM

## 2025-07-14 DIAGNOSIS — E13.22 SECONDARY DM WITH CKD STAGE 3 AND HYPERTENSION (MULTI): ICD-10-CM

## 2025-07-15 RX ORDER — DAPAGLIFLOZIN 10 MG/1
10 TABLET, FILM COATED ORAL DAILY
Qty: 90 TABLET | Refills: 1 | Status: SHIPPED | OUTPATIENT
Start: 2025-07-15

## 2025-07-28 ENCOUNTER — APPOINTMENT (OUTPATIENT)
Dept: PRIMARY CARE | Facility: CLINIC | Age: 75
End: 2025-07-28
Payer: MEDICARE

## 2025-08-14 DIAGNOSIS — E13.22 SECONDARY DM WITH CKD STAGE 3 AND HYPERTENSION (MULTI): ICD-10-CM

## 2025-08-14 DIAGNOSIS — N18.30 SECONDARY DM WITH CKD STAGE 3 AND HYPERTENSION (MULTI): ICD-10-CM

## 2025-08-14 DIAGNOSIS — I12.9 SECONDARY DM WITH CKD STAGE 3 AND HYPERTENSION (MULTI): ICD-10-CM

## 2025-08-21 RX ORDER — DULAGLUTIDE 4.5 MG/.5ML
4.5 INJECTION, SOLUTION SUBCUTANEOUS WEEKLY
Qty: 6 ML | Refills: 1 | Status: SHIPPED | OUTPATIENT
Start: 2025-08-21

## 2025-09-03 DIAGNOSIS — I50.9 HEART FAILURE, UNSPECIFIED HF CHRONICITY, UNSPECIFIED HEART FAILURE TYPE: ICD-10-CM

## 2025-09-03 DIAGNOSIS — I10 ESSENTIAL HYPERTENSION WITH GOAL BLOOD PRESSURE LESS THAN 130/80: ICD-10-CM

## 2025-09-03 RX ORDER — SPIRONOLACTONE 25 MG/1
25 TABLET ORAL DAILY
Qty: 90 TABLET | Refills: 3 | Status: SHIPPED | OUTPATIENT
Start: 2025-09-03

## 2026-01-19 ENCOUNTER — APPOINTMENT (OUTPATIENT)
Dept: PRIMARY CARE | Facility: CLINIC | Age: 76
End: 2026-01-19
Payer: MEDICARE